# Patient Record
Sex: FEMALE | Race: WHITE | Employment: PART TIME | URBAN - NONMETROPOLITAN AREA
[De-identification: names, ages, dates, MRNs, and addresses within clinical notes are randomized per-mention and may not be internally consistent; named-entity substitution may affect disease eponyms.]

---

## 2018-01-29 ENCOUNTER — DOCUMENTATION ONLY (OUTPATIENT)
Dept: FAMILY MEDICINE | Facility: OTHER | Age: 53
End: 2018-01-29

## 2020-04-09 ENCOUNTER — VIRTUAL VISIT (OUTPATIENT)
Dept: FAMILY MEDICINE | Facility: OTHER | Age: 55
End: 2020-04-09
Attending: FAMILY MEDICINE
Payer: COMMERCIAL

## 2020-04-09 DIAGNOSIS — R06.09 DOE (DYSPNEA ON EXERTION): ICD-10-CM

## 2020-04-09 DIAGNOSIS — F17.200 SMOKER: Primary | ICD-10-CM

## 2020-04-09 PROCEDURE — 99213 OFFICE O/P EST LOW 20 MIN: CPT | Mod: TEL | Performed by: FAMILY MEDICINE

## 2020-04-09 NOTE — PROGRESS NOTES
"Subjective     Megan Cash is a 55 year old female who is being evaluated via a billable telephone visit.      The patient has been notified of following:     \"This telephone visit will be conducted via a call between you and your physician/provider. We have found that certain health care needs can be provided without the need for a physical exam.  This service lets us provide the care you need with a short phone conversation.  If a prescription is necessary we can send it directly to your pharmacy.  If lab work is needed we can place an order for that and you can then stop by our lab to have the test done at a later time.    Telephone visits are billed at different rates depending on your insurance coverage. During this emergency period, for some insurers they may be billed the same as an in-person visit.  Please reach out to your insurance provider with any questions.    If during the course of the call the physician/provider feels a telephone visit is not appropriate, you will not be charged for this service.\"    Patient has given verbal consent for Telephone visit?  Yes    Megan Cash complains of   Chief Complaint   Patient presents with     Form Request       ALLERGIES  Patient has no allergy information on record.      A manager at [email or web address]; and still working at this time.    Always having SOB; is a smoker.  Caroline and Jerome - had influenza.  No prior breathing studies/treatment.    3 weeks was exposed to       Patient Active Problem List   Diagnosis     Adenomatous colon polyp     Alcohol abuse, daily use     Alcoholic (H)     Full dentures     PCR positive for hepatitis C virus     Smoker     Past Surgical History:   Procedure Laterality Date     COLONOSCOPY  05/16/2016 5/16/16,[id number],COLONOSCOPY W/ BIOPSIES,colon polyps-repeat in 3 years     CONIZATION LEEP      10/28/2010,Had YAHAIRA 1 but wanted something done     LAPAROSCOPIC TUBAL LIGATION      1988     OTHER SURGICAL HISTORY      " "[id number],OTHER,Full dentures       Social History     Tobacco Use     Smoking status: Current Every Day Smoker     Packs/day: 0.75     Types: Cigarettes   Substance Use Topics     Alcohol use: Yes     Alcohol/week: 0.0 standard drinks     Comment: Alcoholic Drinks/day: 4-6 beer a day     Family History   Problem Relation Age of Onset     Breast Cancer Sister 50        Cancer-breast     Diabetes Sister         Diabetes     Diabetes Paternal Grandfather         Diabetes     Other - See Comments Father         copd           Reviewed and updated as needed this visit by Provider  Meds  Problems  Med Hx  Surg Hx  Fam Hx         Review of Systems   ROS COMP: CONSTITUTIONAL: NEGATIVE for fever, chills, change in weight  ENT/MOUTH: NEGATIVE for ear, mouth and throat problems  RESP: NEGATIVE for significant cough or SOB  CV: NEGATIVE for chest pain, palpitations or peripheral edema       Objective   Reported vitals:  There were no vitals taken for this visit.   healthy, alert and no distress  Psych: Alert and oriented times 3; coherent speech, normal   rate and volume, able to articulate logical thoughts, able   to abstract reason, no tangential thoughts, no hallucinations   or delusions  Her affect is normal.     Diagnostic Test Results:  none         Assessment/Plan:  1. Smoker  Encouraged cessation which patient is unwilling to discuss today.  - Pulmonary Function Test (); Future    2. CAO (dyspnea on exertion)  Likely COPD; but no previous evaluation/testing completed.  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  I encourage her to discuss this with her HR/supervisor as I am unable to provide such a note without a diagnosis.  She is most anxious about the Covid-19 and \"getting sick.\"  I discussed some facts with her and she is somewhat put at ease; but I think overall frustrated with the visit as I am not providing her with a work note.  Will plan for PFTs in the future, once Covid-19 " pandemic has passed.  - Pulmonary Function Test (); Future    Phone call duration:  24 minutes  Start: 1:19 pm  Stop: 1:43 pm    Marisa Zepeda,   Family Practice

## 2020-06-08 ENCOUNTER — TELEPHONE (OUTPATIENT)
Dept: FAMILY MEDICINE | Facility: OTHER | Age: 55
End: 2020-06-08

## 2020-06-08 ENCOUNTER — OFFICE VISIT (OUTPATIENT)
Dept: FAMILY MEDICINE | Facility: OTHER | Age: 55
End: 2020-06-08
Attending: FAMILY MEDICINE
Payer: COMMERCIAL

## 2020-06-08 VITALS
HEART RATE: 86 BPM | TEMPERATURE: 98.1 F | SYSTOLIC BLOOD PRESSURE: 136 MMHG | OXYGEN SATURATION: 95 % | BODY MASS INDEX: 19.71 KG/M2 | HEIGHT: 60 IN | WEIGHT: 100.4 LBS | RESPIRATION RATE: 16 BRPM | DIASTOLIC BLOOD PRESSURE: 86 MMHG

## 2020-06-08 DIAGNOSIS — Z12.2 ENCOUNTER FOR SCREENING FOR LUNG CANCER: ICD-10-CM

## 2020-06-08 DIAGNOSIS — D12.6 ADENOMATOUS POLYP OF COLON, UNSPECIFIED PART OF COLON: ICD-10-CM

## 2020-06-08 DIAGNOSIS — Z00.00 ROUTINE GENERAL MEDICAL EXAMINATION AT A HEALTH CARE FACILITY: Primary | ICD-10-CM

## 2020-06-08 DIAGNOSIS — F10.10 ALCOHOL ABUSE: ICD-10-CM

## 2020-06-08 DIAGNOSIS — Z12.31 ENCOUNTER FOR SCREENING MAMMOGRAM FOR BREAST CANCER: ICD-10-CM

## 2020-06-08 DIAGNOSIS — Z23 NEED FOR VACCINATION: ICD-10-CM

## 2020-06-08 DIAGNOSIS — J44.9 CHRONIC OBSTRUCTIVE PULMONARY DISEASE, UNSPECIFIED COPD TYPE (H): ICD-10-CM

## 2020-06-08 DIAGNOSIS — Z13.220 LIPID SCREENING: ICD-10-CM

## 2020-06-08 DIAGNOSIS — Z12.11 SPECIAL SCREENING FOR MALIGNANT NEOPLASMS, COLON: ICD-10-CM

## 2020-06-08 DIAGNOSIS — Z87.891 PERSONAL HISTORY OF TOBACCO USE: ICD-10-CM

## 2020-06-08 DIAGNOSIS — B19.20 HEPATITIS C VIRUS INFECTION WITHOUT HEPATIC COMA, UNSPECIFIED CHRONICITY: ICD-10-CM

## 2020-06-08 DIAGNOSIS — F41.1 GAD (GENERALIZED ANXIETY DISORDER): ICD-10-CM

## 2020-06-08 LAB
ALBUMIN SERPL-MCNC: 4.5 G/DL (ref 3.5–5.7)
ALP SERPL-CCNC: 54 U/L (ref 34–104)
ALT SERPL W P-5'-P-CCNC: 63 U/L (ref 7–52)
ANION GAP SERPL CALCULATED.3IONS-SCNC: 7 MMOL/L (ref 3–14)
AST SERPL W P-5'-P-CCNC: 64 U/L (ref 13–39)
BASOPHILS # BLD AUTO: 0.1 10E9/L (ref 0–0.2)
BASOPHILS NFR BLD AUTO: 0.8 %
BILIRUB SERPL-MCNC: 0.6 MG/DL (ref 0.3–1)
BUN SERPL-MCNC: 6 MG/DL (ref 7–25)
CALCIUM SERPL-MCNC: 9.7 MG/DL (ref 8.6–10.3)
CHLORIDE SERPL-SCNC: 100 MMOL/L (ref 98–107)
CHOLEST SERPL-MCNC: 211 MG/DL
CO2 SERPL-SCNC: 30 MMOL/L (ref 21–31)
CREAT SERPL-MCNC: 0.82 MG/DL (ref 0.6–1.2)
DIFFERENTIAL METHOD BLD: ABNORMAL
EOSINOPHIL # BLD AUTO: 0.2 10E9/L (ref 0–0.7)
EOSINOPHIL NFR BLD AUTO: 2.5 %
ERYTHROCYTE [DISTWIDTH] IN BLOOD BY AUTOMATED COUNT: 13.1 % (ref 10–15)
GFR SERPL CREATININE-BSD FRML MDRD: ABNORMAL ML/MIN/{1.73_M2}
GLUCOSE SERPL-MCNC: 81 MG/DL (ref 70–105)
HCT VFR BLD AUTO: 48 % (ref 35–47)
HDLC SERPL-MCNC: 89 MG/DL (ref 23–92)
HGB BLD-MCNC: 16.2 G/DL (ref 11.7–15.7)
IMM GRANULOCYTES # BLD: 0 10E9/L (ref 0–0.4)
IMM GRANULOCYTES NFR BLD: 0.1 %
LDLC SERPL CALC-MCNC: 110 MG/DL
LYMPHOCYTES # BLD AUTO: 2.7 10E9/L (ref 0.8–5.3)
LYMPHOCYTES NFR BLD AUTO: 37.7 %
MCH RBC QN AUTO: 32.9 PG (ref 26.5–33)
MCHC RBC AUTO-ENTMCNC: 33.8 G/DL (ref 31.5–36.5)
MCV RBC AUTO: 98 FL (ref 78–100)
MONOCYTES # BLD AUTO: 0.5 10E9/L (ref 0–1.3)
MONOCYTES NFR BLD AUTO: 7.2 %
NEUTROPHILS # BLD AUTO: 3.7 10E9/L (ref 1.6–8.3)
NEUTROPHILS NFR BLD AUTO: 51.7 %
NONHDLC SERPL-MCNC: 122 MG/DL
PLATELET # BLD AUTO: 208 10E9/L (ref 150–450)
POTASSIUM SERPL-SCNC: 4 MMOL/L (ref 3.5–5.1)
PROT SERPL-MCNC: 7.9 G/DL (ref 6.4–8.9)
RBC # BLD AUTO: 4.92 10E12/L (ref 3.8–5.2)
SODIUM SERPL-SCNC: 137 MMOL/L (ref 134–144)
TRIGL SERPL-MCNC: 61 MG/DL
TSH SERPL DL<=0.05 MIU/L-ACNC: 1.01 IU/ML (ref 0.34–5.6)
WBC # BLD AUTO: 7.1 10E9/L (ref 4–11)

## 2020-06-08 PROCEDURE — 85025 COMPLETE CBC W/AUTO DIFF WBC: CPT | Mod: ZL | Performed by: FAMILY MEDICINE

## 2020-06-08 PROCEDURE — 80061 LIPID PANEL: CPT | Mod: ZL | Performed by: FAMILY MEDICINE

## 2020-06-08 PROCEDURE — 99396 PREV VISIT EST AGE 40-64: CPT | Mod: 25 | Performed by: FAMILY MEDICINE

## 2020-06-08 PROCEDURE — 84443 ASSAY THYROID STIM HORMONE: CPT | Mod: ZL | Performed by: FAMILY MEDICINE

## 2020-06-08 PROCEDURE — 80053 COMPREHEN METABOLIC PANEL: CPT | Mod: ZL | Performed by: FAMILY MEDICINE

## 2020-06-08 PROCEDURE — 86803 HEPATITIS C AB TEST: CPT | Mod: ZL | Performed by: FAMILY MEDICINE

## 2020-06-08 PROCEDURE — G0296 VISIT TO DETERM LDCT ELIG: HCPCS | Performed by: FAMILY MEDICINE

## 2020-06-08 PROCEDURE — 90471 IMMUNIZATION ADMIN: CPT | Performed by: FAMILY MEDICINE

## 2020-06-08 PROCEDURE — 36415 COLL VENOUS BLD VENIPUNCTURE: CPT | Mod: ZL | Performed by: FAMILY MEDICINE

## 2020-06-08 PROCEDURE — 87522 HEPATITIS C REVRS TRNSCRPJ: CPT | Mod: ZL | Performed by: FAMILY MEDICINE

## 2020-06-08 PROCEDURE — 90732 PPSV23 VACC 2 YRS+ SUBQ/IM: CPT | Performed by: FAMILY MEDICINE

## 2020-06-08 PROCEDURE — 87902 NFCT AGT GNTYP ALYS HEP C: CPT | Mod: ZL | Performed by: FAMILY MEDICINE

## 2020-06-08 RX ORDER — ALBUTEROL SULFATE 90 UG/1
2 AEROSOL, METERED RESPIRATORY (INHALATION) EVERY 4 HOURS PRN
Qty: 1 INHALER | Refills: 3 | Status: SHIPPED | OUTPATIENT
Start: 2020-06-08 | End: 2020-09-11

## 2020-06-08 RX ORDER — ESCITALOPRAM OXALATE 10 MG/1
10 TABLET ORAL DAILY
Qty: 30 TABLET | Refills: 1 | Status: SHIPPED | OUTPATIENT
Start: 2020-06-08 | End: 2020-08-28

## 2020-06-08 RX ORDER — ALBUTEROL SULFATE 90 UG/1
2 AEROSOL, METERED RESPIRATORY (INHALATION) EVERY 4 HOURS PRN
Qty: 1 INHALER | Refills: 3 | Status: CANCELLED | OUTPATIENT
Start: 2020-06-08

## 2020-06-08 SDOH — HEALTH STABILITY: MENTAL HEALTH: HOW MANY STANDARD DRINKS CONTAINING ALCOHOL DO YOU HAVE ON A TYPICAL DAY?: 5 OR 6

## 2020-06-08 SDOH — HEALTH STABILITY: MENTAL HEALTH: HOW OFTEN DO YOU HAVE 6 OR MORE DRINKS ON ONE OCCASION?: WEEKLY

## 2020-06-08 SDOH — HEALTH STABILITY: MENTAL HEALTH: HOW OFTEN DO YOU HAVE A DRINK CONTAINING ALCOHOL?: 2-3 TIMES A WEEK

## 2020-06-08 ASSESSMENT — MIFFLIN-ST. JEOR: SCORE: 966.91

## 2020-06-08 ASSESSMENT — PAIN SCALES - GENERAL: PAINLEVEL: NO PAIN (0)
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
## 2020-06-08 NOTE — TELEPHONE ENCOUNTER
Patient states requested a medication for anxiety as well and did not receive it.     Please contact patient at [phone].   Christina Kimball on 6/8/2020 at 4:48 PM

## 2020-06-08 NOTE — PATIENT INSTRUCTIONS
Preventive Health Recommendations  Female Ages 50 - 64    Yearly exam: See your health care provider every year in order to  o Review health changes.   o Discuss preventive care.    o Review your medicines if your doctor has prescribed any.      Get a Pap test every three years (unless you have an abnormal result and your provider advises testing more often).    If you get Pap tests with HPV test, you only need to test every 5 years, unless you have an abnormal result.     You do not need a Pap test if your uterus was removed (hysterectomy) and you have not had cancer.    You should be tested each year for STDs (sexually transmitted diseases) if you're at risk.     Have a mammogram every 1 to 2 years.    Have a colonoscopy at age 50, or have a yearly FIT test (stool test). These exams screen for colon cancer.      Have a cholesterol test every 5 years, or more often if advised.    Have a diabetes test (fasting glucose) every three years. If you are at risk for diabetes, you should have this test more often.     If you are at risk for osteoporosis (brittle bone disease), think about having a bone density scan (DEXA).    Shots: Get a flu shot each year. Get a tetanus shot every 10 years.    Nutrition:     Eat at least 5 servings of fruits and vegetables each day.    Eat whole-grain bread, whole-wheat pasta and brown rice instead of white grains and rice.    Get adequate Calcium and Vitamin D.     Lifestyle    Exercise at least 150 minutes a week (30 minutes a day, 5 days a week). This will help you control your weight and prevent disease.    Limit alcohol to one drink per day.    No smoking.     Wear sunscreen to prevent skin cancer.     See your dentist every six months for an exam and cleaning.    See your eye doctor every 1 to 2 years.    Lung Cancer Screening   Frequently Asked Questions  If you are at high-risk for lung cancer, getting screened with low-dose computed tomography (LDCT) every year can help save  your life. This handout offers answers to some of the most common questions about lung cancer screening. If you have other questions, please call [phone] ([phone]).     What is it?  Lung cancer screening uses special X-ray technology to create an image of your lung tissue. The exam is quick and easy and takes less than 10 seconds. We don t give you any medicine or use any needles. You can eat before and after the exam. You don t need to change your clothes as long as the clothing on your chest doesn t contain metal. But, you do need to be able to hold your breath for at least 6 seconds during the exam.    What is the goal of lung cancer screening?  The goal of lung cancer screening is to save lives. Many times, lung cancer is not found until a person starts having physical symptoms. Lung cancer screening can help detect lung cancer in the earliest stages when it may be easier to treat.    Who should be screened for lung cancer?  We suggest lung cancer screening for anyone who is at high-risk for lung cancer. You are in the high-risk group if you:      are between the ages of 55 and 79, and    have smoked at least 1 pack of cigarettes a day for 30 or more years, and    still smoke or have quit within the past 15 years.    However, if you have a new cough or shortness of breath, you should talk to your doctor before being screened.    Some national lung health advocacy groups also recommend screening for people ages 50 to 79 who have smoked an average of 1 pack of cigarettes a day for 20 years. They must also have at least 1 other risk factor for lung cancer, not including exposure to secondhand smoke. Other risk factors are having had cancer in the past, emphysema, pulmonary fibrosis, COPD, a family history of lung cancer, or exposure to certain materials such as arsenic, asbestos, beryllium, cadmium, chromium, diesel fumes, nickel, radon or silica. Your care team can help you know if you have one of  these risk factors.     Why does it matter if I have symptoms?  Certain symptoms can be a sign that you have a condition in your lungs that should be checked and treated by your doctor. These symptoms include fever, chest pain, a new or changing cough, shortness of breath that you have never felt before, coughing up blood or unexplained weight loss. Having any of these symptoms can greatly affect the results of lung cancer screening.       Should all smokers get an LDCT lung cancer screening exam?  It depends. Lung cancer screening is for a very specific group of men and women who have a history of heavy smoking over a long period of time (see  Who should be screened for lung cancer  above).  I am in the high-risk group, but have been diagnosed with cancer in the past. Is LDCT lung cancer screening right for me?  In some cases, you should not have LDCT lung screening, such as when your doctor is already following your cancer with CT scan studies. Your doctor will help you decide if LDCT lung screening is right for you.  Do I need to have a screening exam every year?  Yes. If you are in the high-risk group described earlier, you should get an LDCT lung cancer screening exam every year until you are 79, or are no longer willing or able to undergo screening and possible procedures to diagnose and treat lung cancer.  How effective is LDCT at preventing death from lung cancer?  Studies have shown that LDCT lung cancer screening can lower the risk of death from lung cancer by 20 percent in people who are at high-risk.  What are the risks?  There are some risks and limitations of LDCT lung cancer screening. We want to make sure you understand the risks and benefits, so please let us know if you have any questions. Your doctor may want to talk with you more about these risks.    Radiation exposure: As with any exam that uses radiation, there is a very small increased risk of cancer. The amount of radiation in LDCT is  small--about the same amount a person would get from a mammogram. Your doctor orders the exam when he or she feels the potential benefits outweigh the risks.    False negatives: No test is perfect, including LDCT. It is possible that you may have a medical condition, including lung cancer, that is not found during your exam. This is called a false negative result.    False positives and more testing: LDCT very often finds something in the lung that could be cancer, but in fact is not. This is called a false positive result. False positive tests often cause anxiety. To make sure these findings are not cancer, you may need to have more tests. These tests will be done only if you give us permission. Sometimes patients need a treatment that can have side effects, such as a biopsy. For more information on false positives, see  What can I expect from the results?     Findings not related to lung cancer: Your LDCT exam also takes pictures of areas of your body next to your lungs. In a very small number of cases, the CT scan will show an abnormal finding in one of these areas, such as your kidneys, adrenal glands, liver or thyroid. This finding may not be serious, but you may need more tests. Your doctor can help you decide what other tests you may need, if any.  What can I expect from the results?  About 1 out of 4 LDCT exams will find something that may need more tests. Most of the time, these findings are lung nodules. Lung nodules are very small collections of tissue in the lung. These nodules are very common, and the vast majority--more than 97 percent--are not cancer (benign). Most are normal lymph nodes or small areas of scarring from past infections.  But, if a small lung nodule is found to be cancer, the cancer can be cured more than 90 percent of the time. To know if the nodule is cancer, we may need to get more images before your next yearly screening exam. If the nodule has suspicious features (for example, it  is large, has an odd shape or grows over time), we will refer you to a specialist for further testing.  Will my doctor also get the results?  Yes. Your doctor will get a copy of your results.  Is it okay to keep smoking now that there s a cancer screening exam?  No. Tobacco is one of the strongest cancer-causing agents. It causes not only lung cancer, but other cancers and cardiovascular (heart) diseases as well. The damage caused by smoking builds over time. This means that the longer you smoke, the higher your risk of disease. While it is never too late to quit, the sooner you quit, the better.  Where can I find help to quit smoking?  The best way to prevent lung cancer is to stop smoking. If you have already quit smoking, congratulations and keep it up! For help on quitting smoking, please call [employer] at 6-661-953-BOCW ([phone]) or the American Cancer Society at 1-152.693.5467 to find local resources near you.  One-on-one health coaching:  If you d prefer to work individually with a health care provider on tobacco cessation, we offer:      Medication Therapy Management:  Our specially trained pharmacists work closely with you and your doctor to help you quit smoking.  Call [phone] or [phone] (toll free).     Can Do: Health coaching offered by [facility].  [email or web address]
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
## 2020-06-08 NOTE — NURSING NOTE
Chief Complaint   Patient presents with     Physical     annual     COPD     Discuss hep C and have a test for it.  Has appointment with Gastroenterology [facility] 6/10/20.      Initial /86   Pulse 86   Temp 98.1  F (36.7  C) (Temporal)   Resp 16   Ht 1.524 m (5')   Wt 45.5 kg (100 lb 6.4 oz)   SpO2 95%   BMI 19.61 kg/m   Estimated body mass index is 19.61 kg/m  as calculated from the following:    Height as of this encounter: 1.524 m (5').    Weight as of this encounter: 45.5 kg (100 lb 6.4 oz).    Medication Reconciliation: complete      Norma J. Gosselin, LPN

## 2020-06-09 ENCOUNTER — MYC MEDICAL ADVICE (OUTPATIENT)
Dept: FAMILY MEDICINE | Facility: OTHER | Age: 55
End: 2020-06-09

## 2020-06-09 NOTE — TELEPHONE ENCOUNTER
Lexapro rx was given at 6/8/2020 appointment. Notified patient  Oxana Moreno LPN ....................  6/9/2020   9:30 AM

## 2020-06-10 ENCOUNTER — TELEPHONE (OUTPATIENT)
Dept: FAMILY MEDICINE | Facility: OTHER | Age: 55
End: 2020-06-10

## 2020-06-10 DIAGNOSIS — Z01.818 PRE-OP TESTING: Primary | ICD-10-CM

## 2020-06-10 DIAGNOSIS — Z86.0100 HISTORY OF COLON POLYPS: ICD-10-CM

## 2020-06-10 LAB — HCV AB SERPL QL IA: REACTIVE

## 2020-06-10 NOTE — TELEPHONE ENCOUNTER
Screening Questions for the Scheduling of Screening Colonoscopies   (If Colonoscopy is diagnostic, Provider should review the chart before scheduling.)  Are you younger than 50 or older than 80?  NO   Do you take aspirin or fish oil?  NO  (if yes, tell patient to stop 1 week prior to Colonoscopy)  Do you take warfarin (Coumadin), clopidogrel (Plavix), apixaban (Eliquis), dabigatram (Pradaxa), rivaroxaban (Xarelto) or any blood thinner? NO   Do you use oxygen at home?  NO   Do you have kidney disease? NO   Are you on dialysis? NO   Have you had a stroke or heart attack in the last year? N   Have you had a stent in your heart or any blood vessel in the last year? NO   Have you had a transplant of any organ? NO   Have you had a colonoscopy or upper endoscopy (EGD) before? YES          When?  4 YRS AGO ?  Date of scheduled Colonoscopy. 07/07/2020  Provider [facility]   Pharmacy [facility]

## 2020-06-10 NOTE — TELEPHONE ENCOUNTER
Dr. Veronica is out of the clinic today, and not scheduled to return until Monday 6/15.    In clinical absence of Dr. Veronica, patient is requesting that this message be sent to the Doc of the Day for consideration please.     Janna Beverly RN .............. 6/10/2020  12:56 PM

## 2020-06-10 NOTE — TELEPHONE ENCOUNTER
[facility] ordered labs and she needs to schedule a lab only appt.  Norma J. Gosselin, LPN .......  6/10/2020  4:29 PM

## 2020-06-11 LAB
HCV RNA SERPL NAA+PROBE-ACNC: ABNORMAL [IU]/ML
HCV RNA SERPL NAA+PROBE-LOG IU: 6.6 LOG IU/ML

## 2020-06-16 RX ORDER — POLYETHYLENE GLYCOL 3350, SODIUM CHLORIDE, SODIUM BICARBONATE, POTASSIUM CHLORIDE 420; 11.2; 5.72; 1.48 G/4L; G/4L; G/4L; G/4L
4000 POWDER, FOR SOLUTION ORAL ONCE
Qty: 4000 ML | Refills: 0 | Status: ON HOLD | OUTPATIENT
Start: 2020-06-16 | End: 2020-07-07

## 2020-06-16 RX ORDER — BISACODYL 5 MG
TABLET, DELAYED RELEASE (ENTERIC COATED) ORAL
Qty: 2 TABLET | Refills: 0 | Status: ON HOLD | OUTPATIENT
Start: 2020-06-16 | End: 2020-07-07

## 2020-06-17 LAB — HCV GENTYP SERPL NAA+PROBE: NORMAL

## 2020-06-23 ENCOUNTER — TELEPHONE (OUTPATIENT)
Dept: FAMILY MEDICINE | Facility: OTHER | Age: 55
End: 2020-06-23

## 2020-06-23 DIAGNOSIS — F40.240 CLAUSTROPHOBIA: Primary | ICD-10-CM

## 2020-06-29 RX ORDER — DIAZEPAM 2 MG
2 TABLET ORAL ONCE
Qty: 1 TABLET | Refills: 0 | Status: SHIPPED | OUTPATIENT
Start: 2020-06-29 | End: 2020-07-21

## 2020-07-04 ENCOUNTER — ALLIED HEALTH/NURSE VISIT (OUTPATIENT)
Dept: FAMILY MEDICINE | Facility: OTHER | Age: 55
End: 2020-07-04
Attending: SURGERY
Payer: COMMERCIAL

## 2020-07-04 DIAGNOSIS — Z01.818 PRE-OP TESTING: ICD-10-CM

## 2020-07-04 PROCEDURE — U0003 INFECTIOUS AGENT DETECTION BY NUCLEIC ACID (DNA OR RNA); SEVERE ACUTE RESPIRATORY SYNDROME CORONAVIRUS 2 (SARS-COV-2) (CORONAVIRUS DISEASE [COVID-19]), AMPLIFIED PROBE TECHNIQUE, MAKING USE OF HIGH THROUGHPUT TECHNOLOGIES AS DESCRIBED BY CMS-2020-01-R: HCPCS | Mod: ZL | Performed by: SURGERY

## 2020-07-04 PROCEDURE — C9803 HOPD COVID-19 SPEC COLLECT: HCPCS

## 2020-07-04 PROCEDURE — 99207 ZZC NO CHARGE NURSE ONLY: CPT

## 2020-07-05 LAB
SARS-COV-2 RNA SPEC QL NAA+PROBE: NOT DETECTED
SPECIMEN SOURCE: NORMAL

## 2020-07-07 ENCOUNTER — ANESTHESIA (OUTPATIENT)
Dept: SURGERY | Facility: OTHER | Age: 55
End: 2020-07-07
Payer: COMMERCIAL

## 2020-07-07 ENCOUNTER — ANESTHESIA EVENT (OUTPATIENT)
Dept: SURGERY | Facility: OTHER | Age: 55
End: 2020-07-07
Payer: COMMERCIAL

## 2020-07-07 ENCOUNTER — HOSPITAL ENCOUNTER (OUTPATIENT)
Facility: OTHER | Age: 55
Discharge: HOME | End: 2020-07-07
Attending: SURGERY | Admitting: SURGERY
Payer: COMMERCIAL

## 2020-07-07 VITALS
HEART RATE: 81 BPM | BODY MASS INDEX: 19.63 KG/M2 | HEIGHT: 60 IN | TEMPERATURE: 97.8 F | RESPIRATION RATE: 14 BRPM | DIASTOLIC BLOOD PRESSURE: 64 MMHG | OXYGEN SATURATION: 98 % | SYSTOLIC BLOOD PRESSURE: 109 MMHG | WEIGHT: 100 LBS

## 2020-07-07 DIAGNOSIS — B18.2 HEP C W/ COMA, CHRONIC: ICD-10-CM

## 2020-07-07 LAB
ALBUMIN SERPL-MCNC: 4 G/DL (ref 3.5–5.7)
ALP SERPL-CCNC: 43 U/L (ref 34–104)
ALT SERPL W P-5'-P-CCNC: 35 U/L (ref 7–52)
ANION GAP SERPL CALCULATED.3IONS-SCNC: 10 MMOL/L (ref 3–14)
AST SERPL W P-5'-P-CCNC: 41 U/L (ref 13–39)
BASOPHILS # BLD AUTO: 0.1 10E9/L (ref 0–0.2)
BASOPHILS NFR BLD AUTO: 0.9 %
BILIRUB SERPL-MCNC: 0.3 MG/DL (ref 0.3–1)
BUN SERPL-MCNC: 4 MG/DL (ref 7–25)
CALCIUM SERPL-MCNC: 9.3 MG/DL (ref 8.6–10.3)
CHLORIDE SERPL-SCNC: 100 MMOL/L (ref 98–107)
CO2 SERPL-SCNC: 27 MMOL/L (ref 21–31)
CREAT SERPL-MCNC: 0.66 MG/DL (ref 0.6–1.2)
DIFFERENTIAL METHOD BLD: NORMAL
EOSINOPHIL # BLD AUTO: 0.1 10E9/L (ref 0–0.7)
EOSINOPHIL NFR BLD AUTO: 2.2 %
ERYTHROCYTE [DISTWIDTH] IN BLOOD BY AUTOMATED COUNT: 13.2 % (ref 10–15)
GFR SERPL CREATININE-BSD FRML MDRD: >90 ML/MIN/{1.73_M2}
GLUCOSE SERPL-MCNC: 85 MG/DL (ref 70–105)
HCT VFR BLD AUTO: 45.8 % (ref 35–47)
HGB BLD-MCNC: 15.5 G/DL (ref 11.7–15.7)
IMM GRANULOCYTES # BLD: 0 10E9/L (ref 0–0.4)
IMM GRANULOCYTES NFR BLD: 0.3 %
INR PPP: 0.93 (ref 0–1.3)
LYMPHOCYTES # BLD AUTO: 1.9 10E9/L (ref 0.8–5.3)
LYMPHOCYTES NFR BLD AUTO: 32.4 %
MCH RBC QN AUTO: 33 PG (ref 26.5–33)
MCHC RBC AUTO-ENTMCNC: 33.8 G/DL (ref 31.5–36.5)
MCV RBC AUTO: 98 FL (ref 78–100)
MONOCYTES # BLD AUTO: 0.5 10E9/L (ref 0–1.3)
MONOCYTES NFR BLD AUTO: 7.7 %
NEUTROPHILS # BLD AUTO: 3.3 10E9/L (ref 1.6–8.3)
NEUTROPHILS NFR BLD AUTO: 56.5 %
PLATELET # BLD AUTO: 214 10E9/L (ref 150–450)
POTASSIUM SERPL-SCNC: 4.6 MMOL/L (ref 3.5–5.1)
PROT SERPL-MCNC: 7.4 G/DL (ref 6.4–8.9)
PROTHROMBIN TIME: 11.1 S (ref 11.9–15.2)
RBC # BLD AUTO: 4.69 10E12/L (ref 3.8–5.2)
SODIUM SERPL-SCNC: 137 MMOL/L (ref 134–144)
WBC # BLD AUTO: 5.8 10E9/L (ref 4–11)

## 2020-07-07 PROCEDURE — 45385 COLONOSCOPY W/LESION REMOVAL: CPT | Performed by: NURSE ANESTHETIST, CERTIFIED REGISTERED

## 2020-07-07 PROCEDURE — 80053 COMPREHEN METABOLIC PANEL: CPT | Mod: ZL

## 2020-07-07 PROCEDURE — 83883 ASSAY NEPHELOMETRY NOT SPEC: CPT | Mod: ZL

## 2020-07-07 PROCEDURE — 45380 COLONOSCOPY AND BIOPSY: CPT | Performed by: SURGERY

## 2020-07-07 PROCEDURE — 25000125 ZZHC RX 250: Performed by: NURSE ANESTHETIST, CERTIFIED REGISTERED

## 2020-07-07 PROCEDURE — 25800030 ZZH RX IP 258 OP 636: Performed by: SURGERY

## 2020-07-07 PROCEDURE — 86706 HEP B SURFACE ANTIBODY: CPT | Mod: ZL

## 2020-07-07 PROCEDURE — 86704 HEP B CORE ANTIBODY TOTAL: CPT | Mod: ZL

## 2020-07-07 PROCEDURE — 85025 COMPLETE CBC W/AUTO DIFF WBC: CPT | Mod: ZL

## 2020-07-07 PROCEDURE — 45385 COLONOSCOPY W/LESION REMOVAL: CPT | Mod: PT | Performed by: SURGERY

## 2020-07-07 PROCEDURE — 85610 PROTHROMBIN TIME: CPT | Mod: ZL

## 2020-07-07 PROCEDURE — 82977 ASSAY OF GGT: CPT | Mod: ZL

## 2020-07-07 PROCEDURE — 84460 ALANINE AMINO (ALT) (SGPT): CPT | Mod: ZL

## 2020-07-07 PROCEDURE — 88305 TISSUE EXAM BY PATHOLOGIST: CPT

## 2020-07-07 PROCEDURE — 87902 NFCT AGT GNTYP ALYS HEP C: CPT | Mod: ZL

## 2020-07-07 PROCEDURE — 86709 HEPATITIS A IGM ANTIBODY: CPT | Mod: ZL

## 2020-07-07 PROCEDURE — 84520 ASSAY OF UREA NITROGEN: CPT | Mod: ZL

## 2020-07-07 PROCEDURE — 87340 HEPATITIS B SURFACE AG IA: CPT | Mod: ZL

## 2020-07-07 PROCEDURE — 40000010 ZZH STATISTIC ANES STAT CODE-CRNA PER MINUTE: Performed by: SURGERY

## 2020-07-07 PROCEDURE — 25000125 ZZHC RX 250: Performed by: SURGERY

## 2020-07-07 PROCEDURE — 36415 COLL VENOUS BLD VENIPUNCTURE: CPT | Mod: ZL

## 2020-07-07 PROCEDURE — 84450 TRANSFERASE (AST) (SGOT): CPT | Mod: ZL

## 2020-07-07 PROCEDURE — 25000128 H RX IP 250 OP 636: Performed by: NURSE ANESTHETIST, CERTIFIED REGISTERED

## 2020-07-07 RX ORDER — PROPOFOL 10 MG/ML
INJECTION, EMULSION INTRAVENOUS PRN
Status: DISCONTINUED | OUTPATIENT
Start: 2020-07-07 | End: 2020-07-07

## 2020-07-07 RX ORDER — LIDOCAINE HYDROCHLORIDE 20 MG/ML
INJECTION, SOLUTION INFILTRATION; PERINEURAL PRN
Status: DISCONTINUED | OUTPATIENT
Start: 2020-07-07 | End: 2020-07-07

## 2020-07-07 RX ORDER — PROPOFOL 10 MG/ML
INJECTION, EMULSION INTRAVENOUS CONTINUOUS PRN
Status: DISCONTINUED | OUTPATIENT
Start: 2020-07-07 | End: 2020-07-07

## 2020-07-07 RX ORDER — SODIUM CHLORIDE, SODIUM LACTATE, POTASSIUM CHLORIDE, CALCIUM CHLORIDE 600; 310; 30; 20 MG/100ML; MG/100ML; MG/100ML; MG/100ML
INJECTION, SOLUTION INTRAVENOUS CONTINUOUS
Status: DISCONTINUED | OUTPATIENT
Start: 2020-07-07 | End: 2020-07-07 | Stop reason: HOSPADM

## 2020-07-07 RX ORDER — LIDOCAINE 40 MG/G
CREAM TOPICAL
Status: DISCONTINUED | OUTPATIENT
Start: 2020-07-07 | End: 2020-07-07 | Stop reason: HOSPADM

## 2020-07-07 RX ORDER — FLUMAZENIL 0.1 MG/ML
0.2 INJECTION, SOLUTION INTRAVENOUS
Status: DISCONTINUED | OUTPATIENT
Start: 2020-07-07 | End: 2020-07-07 | Stop reason: HOSPADM

## 2020-07-07 RX ORDER — NALOXONE HYDROCHLORIDE 0.4 MG/ML
.1-.4 INJECTION, SOLUTION INTRAMUSCULAR; INTRAVENOUS; SUBCUTANEOUS
Status: DISCONTINUED | OUTPATIENT
Start: 2020-07-07 | End: 2020-07-07 | Stop reason: HOSPADM

## 2020-07-07 RX ADMIN — SODIUM CHLORIDE, POTASSIUM CHLORIDE, SODIUM LACTATE AND CALCIUM CHLORIDE 30 ML/HR: 600; 310; 30; 20 INJECTION, SOLUTION INTRAVENOUS at 08:20

## 2020-07-07 RX ADMIN — LIDOCAINE HYDROCHLORIDE 40 MG: 20 INJECTION, SOLUTION INFILTRATION; PERINEURAL at 08:40

## 2020-07-07 RX ADMIN — PROPOFOL 50 MG: 10 INJECTION, EMULSION INTRAVENOUS at 08:40

## 2020-07-07 RX ADMIN — PROPOFOL 140 MCG/KG/MIN: 10 INJECTION, EMULSION INTRAVENOUS at 08:40

## 2020-07-07 SDOH — HEALTH STABILITY: MENTAL HEALTH: CURRENT SMOKER: 1

## 2020-07-07 ASSESSMENT — MIFFLIN-ST. JEOR: SCORE: 965.1

## 2020-07-07 ASSESSMENT — COPD QUESTIONNAIRES: COPD: 1

## 2020-07-07 ASSESSMENT — LIFESTYLE VARIABLES: TOBACCO_USE: 1

## 2020-07-07 NOTE — INTERVAL H&P NOTE
I saw and examined Megan Cash.  I have reviewed the history and physical and find no changes to the patient's medical status or condition with the exceptions noted below.   Megan Cash denies family history of colon cancer or inflammatory bowel disease. Patient denies change in bowel habits or blood in stools. Previous colonoscopy was 5 yr ago, adenomatous polyps.   The technical details of colonoscopy were discussed with the patient along with the risks and benefits to include bleeding, perforation and incomplete study. Megan Cash demonstrated understanding and is willing to proceed.       Robson Baeza MD   8:31 AM 7/7/2020

## 2020-07-07 NOTE — DISCHARGE INSTRUCTIONS
[facility]  Adult Discharge Orders & Instructions    ________________________________________________________________          For 12 hours after surgery  1. Get plenty of rest.  A responsible adult must stay with you for at least 12 hours after you leave the hospital.   2. You may feel lightheaded.  IF so, sit for a few minutes before standing.  Have someone help you get up.   3. You may have a slight fever. Call the doctor if your fever is over 101 F (38.3 C) (taken under the tongue) or lasts longer than 24 hours.  4. You may have a dry mouth, a sore throat, muscle aches or trouble sleeping.  These should go away after 24 hours.  5. Do not make important or legal decisions.  6.   Do not drive or use heavy equipment.  If you have weakness or tingling, don't drive or use heavy equipment until this feeling goes away.    To contact a doctor, call   [phone]_______________________

## 2020-07-07 NOTE — ANESTHESIA CARE TRANSFER NOTE
Patient: Megan Cash    Procedure(s):  COLONOSCOPY, WITH POLYPECTOMY AND BIOPSY    Diagnosis: History of colon polyps [Z86.010]  Diagnosis Additional Information: No value filed.    Anesthesia Type:   MAC     Note:  Airway :Room Air  Patient transferred to:Phase II  Handoff Report: Identifed the Patient, Identified the Reponsible Provider, Reviewed the pertinent medical history, Discussed the surgical course, Reviewed Intra-OP anesthesia mangement and issues during anesthesia, Set expectations for post-procedure period and Allowed opportunity for questions and acknowledgement of understanding      Vitals: (Last set prior to Anesthesia Care Transfer)    CRNA VITALS  7/7/2020 0846 - 7/7/2020 0917      7/7/2020             Pulse:  83    Ht Rate:  83    SpO2:  98 %    Resp Rate (set):  10                Electronically Signed By: BANDAR LAU CRNA  July 7, 2020  9:17 AM

## 2020-07-07 NOTE — ANESTHESIA POSTPROCEDURE EVALUATION
Patient: Megan Cash    Procedure(s):  COLONOSCOPY, WITH POLYPECTOMY AND BIOPSY    Diagnosis:History of colon polyps [Z86.010]  Diagnosis Additional Information: No value filed.    Anesthesia Type:  MAC    Note:  Anesthesia Post Evaluation    Patient location during evaluation: Phase 2  Patient participation: Able to fully participate in evaluation  Level of consciousness: awake and alert  Pain management: adequate  Airway patency: patent  Cardiovascular status: acceptable  Respiratory status: acceptable  Hydration status: acceptable  PONV: none             Last vitals:  Vitals:    07/07/20 0920 07/07/20 0930 07/07/20 0945   BP: (!) 88/52 98/71 109/64   Pulse: 80 83 81   Resp:      Temp: 97.9  F (36.6  C)  97.8  F (36.6  C)   SpO2: 98% 99% 98%         Electronically Signed By: BANDAR LAU CRNA  July 7, 2020  10:42 AM

## 2020-07-07 NOTE — OR NURSING
AVS reviewed, pt denies questions. MD in to speak with pt earlier, she was awake. Pt steady on feet, up to bathroom to void. IV removed. Pt denies dizziness or pain. Taken out ambulatory to daughter in laws car in front of clinic.

## 2020-07-07 NOTE — ANESTHESIA PREPROCEDURE EVALUATION
Anesthesia Pre-Procedure Evaluation    Patient: Megan Cash   MRN: [id number] : 1965          Preoperative Diagnosis: History of colon polyps [Z86.010]    Procedure(s):  COLONOSCOPY    Past Medical History:   Diagnosis Date     Nicotine dependence, uncomplicated     No Comments Provided     Presence of dental prosthetic device     No Comments Provided     Uncomplicated alcohol abuse     No Comments Provided     Viral hepatitis C without hepatic coma     2016     Past Surgical History:   Procedure Laterality Date     COLONOSCOPY  2016,[id number],COLONOSCOPY W/ BIOPSIES,colon polyps-repeat in 3 years     CONIZATION LEEP      10/28/2010,Had YAHAIRA 1 but wanted something done     LAPAROSCOPIC TUBAL LIGATION           OTHER SURGICAL HISTORY      [id number],OTHER,Full dentures       Anesthesia Evaluation     . Pt has had prior anesthetic. Type: General and MAC           ROS/MED HX    ENT/Pulmonary: Comment: Has PFT testing next month, sob with activity    (+)JOSEPH risk factors snores loudly, tobacco use, Current use .75 packs/day  COPD, , . .    Neurologic:  - neg neurologic ROS     Cardiovascular:  - neg cardiovascular ROS       METS/Exercise Tolerance:  4 - Raking leaves, gardening   Hematologic:  - neg hematologic  ROS       Musculoskeletal:  - neg musculoskeletal ROS       GI/Hepatic:     (+) bowel prep, hepatitis type C, liver disease,       Renal/Genitourinary:  - ROS Renal section negative       Endo:  - neg endo ROS       Psychiatric:     (+) psychiatric history anxiety (ETOH abuse hx, quit drugs 15 yrs ago)      Infectious Disease:  - neg infectious disease ROS       Malignancy:      - no malignancy   Other:    - neg other ROS                      Physical Exam  Normal systems: pulmonary    Airway   Mallampati: II  TM distance: >3 FB  Neck ROM: full    Dental   (+) upper dentures and lower dentures    Cardiovascular   Rhythm and rate: regular and normal      Pulmonary             Lab  Results   Component Value Date    WBC 7.1 06/08/2020    HGB 16.2 (H) 06/08/2020    HCT 48.0 (H) 06/08/2020     06/08/2020     06/08/2020    POTASSIUM 4.0 06/08/2020    CHLORIDE 100 06/08/2020    CO2 30 06/08/2020    BUN 6 (L) 06/08/2020    CR 0.82 06/08/2020    GLC 81 06/08/2020    KIT 9.7 06/08/2020    ALBUMIN 4.5 06/08/2020    PROTTOTAL 7.9 06/08/2020    ALT 63 (H) 06/08/2020    AST 64 (H) 06/08/2020    ALKPHOS 54 06/08/2020    BILITOTAL 0.6 06/08/2020       Preop Vitals  BP Readings from Last 3 Encounters:   07/07/20 115/82   06/08/20 136/86   05/02/16 (!) 138/102    Pulse Readings from Last 3 Encounters:   06/08/20 86   05/02/16 70      Resp Readings from Last 3 Encounters:   07/07/20 14   06/08/20 16    SpO2 Readings from Last 3 Encounters:   07/07/20 95%   06/08/20 95%      Temp Readings from Last 1 Encounters:   07/07/20 96.4  F (35.8  C) (Tympanic)    Ht Readings from Last 1 Encounters:   07/07/20 1.524 m (5')      Wt Readings from Last 1 Encounters:   07/07/20 45.4 kg (100 lb)    Estimated body mass index is 19.53 kg/m  as calculated from the following:    Height as of this encounter: 1.524 m (5').    Weight as of this encounter: 45.4 kg (100 lb).       Anesthesia Plan      History & Physical Review      ASA Status:  3 .    NPO Status:  > 4 hours    Plan for MAC with Propofol induction. Reason for MAC:  Extreme anxiety (QS)      The patient is a current Smoker, Patient was instructed to abstain from smoking on day of procedure and patient smoked on day of surgery     Postoperative Care      Consents  Anesthetic plan, risks, benefits and alternatives discussed with:  Patient and Daughter/Son..                 BANDAR LAU CRNA

## 2020-07-08 LAB
HAV IGM SERPL QL IA: NONREACTIVE
HBV CORE AB SERPL QL IA: NONREACTIVE
HBV SURFACE AB SERPL IA-ACNC: 0.63 M[IU]/ML
HBV SURFACE AG SERPL QL IA: NONREACTIVE

## 2020-07-09 ENCOUNTER — HOSPITAL ENCOUNTER (OUTPATIENT)
Dept: MAMMOGRAPHY | Facility: OTHER | Age: 55
End: 2020-07-09
Attending: FAMILY MEDICINE
Payer: COMMERCIAL

## 2020-07-09 ENCOUNTER — HOSPITAL ENCOUNTER (OUTPATIENT)
Dept: RESPIRATORY THERAPY | Facility: OTHER | Age: 55
End: 2020-07-09
Attending: FAMILY MEDICINE
Payer: COMMERCIAL

## 2020-07-09 ENCOUNTER — HOSPITAL ENCOUNTER (OUTPATIENT)
Dept: CT IMAGING | Facility: OTHER | Age: 55
End: 2020-07-09
Attending: FAMILY MEDICINE
Payer: COMMERCIAL

## 2020-07-09 DIAGNOSIS — F17.200 SMOKER: ICD-10-CM

## 2020-07-09 DIAGNOSIS — Z12.31 ENCOUNTER FOR SCREENING MAMMOGRAM FOR BREAST CANCER: ICD-10-CM

## 2020-07-09 DIAGNOSIS — Z12.2 ENCOUNTER FOR SCREENING FOR LUNG CANCER: ICD-10-CM

## 2020-07-09 DIAGNOSIS — R06.09 DOE (DYSPNEA ON EXERTION): ICD-10-CM

## 2020-07-09 PROCEDURE — 94726 PLETHYSMOGRAPHY LUNG VOLUMES: CPT

## 2020-07-09 PROCEDURE — 77063 BREAST TOMOSYNTHESIS BI: CPT

## 2020-07-09 PROCEDURE — 94726 PLETHYSMOGRAPHY LUNG VOLUMES: CPT | Mod: 26 | Performed by: INTERNAL MEDICINE

## 2020-07-09 PROCEDURE — 40000275 ZZH STATISTIC RCP TIME EA 10 MIN

## 2020-07-09 PROCEDURE — 94729 DIFFUSING CAPACITY: CPT | Mod: 26 | Performed by: INTERNAL MEDICINE

## 2020-07-09 PROCEDURE — G0297 LDCT FOR LUNG CA SCREEN: HCPCS

## 2020-07-09 PROCEDURE — 94729 DIFFUSING CAPACITY: CPT

## 2020-07-09 PROCEDURE — 94010 BREATHING CAPACITY TEST: CPT | Mod: 26 | Performed by: INTERNAL MEDICINE

## 2020-07-09 PROCEDURE — 94010 BREATHING CAPACITY TEST: CPT

## 2020-07-09 RX ORDER — ALBUTEROL SULFATE 90 UG/1
2 AEROSOL, METERED RESPIRATORY (INHALATION) EVERY 6 HOURS PRN
Status: DISCONTINUED | OUTPATIENT
Start: 2020-07-09 | End: 2020-07-10 | Stop reason: HOSPADM

## 2020-07-11 LAB
A2 MACROGLOB SERPL-MCNC: 257 MG/DL (ref 131–293)
ALT SERPL-CCNC: 41 U/L (ref 5–40)
ANNOTATION COMMENT IMP: ABNORMAL
AST SERPL-CCNC: 51 U/L (ref 9–40)
BUN SERPL-MCNC: 4 MG/DL (ref 7–20)
CIRRHOMETER PATIENT SCORE: 0.02
FIBROSIS STAGE SERPL QL: ABNORMAL
GGT SERPL-CCNC: 17 U/L (ref 7–33)
INFLAMETER METAVIR CLASSIFICATION: ABNORMAL
INFLAMETER PATIENT SCORE: 0.33
LIVER FIBR SCORE SERPL CALC.FIBROMETER: 0.46
PATHOLOGY STUDY: ABNORMAL
PLATELET # BLD: 214 10*3/UL
PT INDEX PPP: 104 % (ref 90–120)

## 2020-07-13 ENCOUNTER — TELEPHONE (OUTPATIENT)
Dept: FAMILY MEDICINE | Facility: OTHER | Age: 55
End: 2020-07-13

## 2020-07-13 NOTE — TELEPHONE ENCOUNTER
Reason for call: Request for results.    Name of test or procedure: Pulmonary test    Date of test or procedure: 7/8    Location of test or procedure: [facility]    Preferred method for responding to this message: Telephone Call    Phone number patient can be reached at: Cell number on file:    Telephone Information:   Mobile [phone]       If we can't reach you directly, may we leave a detailed response at the number you provided?Yes    States she is needing the results from the pulmonary test so a form can be filled out for pt

## 2020-07-15 ENCOUNTER — TELEPHONE (OUTPATIENT)
Dept: FAMILY MEDICINE | Facility: OTHER | Age: 55
End: 2020-07-15

## 2020-07-15 LAB — HCV GENTYP SERPL NAA+PROBE: NORMAL

## 2020-07-15 NOTE — TELEPHONE ENCOUNTER
EDUIN Veronica-pts daughter in law-Caroline was transferred to me after speaking with nurse to schedule an appt. Caroline is asking for a work in appt for tomorrow. Doesn't think pt should wait til next week. Please call again. Thank you.  Renee Henriquez

## 2020-07-16 NOTE — TELEPHONE ENCOUNTER
They can't make it today but I scheduled her for 7/21/20 at 1440.  Norma J. Gosselin, LPN .......  7/16/2020  12:30 PM

## 2020-07-20 NOTE — TELEPHONE ENCOUNTER
Closing encounter. Please see PFT result note, patient and her daughter were notified of results. She has appointment scheduled for tomorrow with Dr. Veronica.    Mariam Monreal LPN on 7/20/2020 at 9:19 AM

## 2020-07-21 ENCOUNTER — OFFICE VISIT (OUTPATIENT)
Dept: FAMILY MEDICINE | Facility: OTHER | Age: 55
End: 2020-07-21
Attending: FAMILY MEDICINE
Payer: COMMERCIAL

## 2020-07-21 VITALS
RESPIRATION RATE: 16 BRPM | OXYGEN SATURATION: 93 % | HEART RATE: 91 BPM | DIASTOLIC BLOOD PRESSURE: 60 MMHG | SYSTOLIC BLOOD PRESSURE: 110 MMHG | WEIGHT: 96.4 LBS | TEMPERATURE: 97.5 F | BODY MASS INDEX: 18.93 KG/M2 | HEIGHT: 60 IN

## 2020-07-21 DIAGNOSIS — J44.9 CHRONIC OBSTRUCTIVE PULMONARY DISEASE, UNSPECIFIED COPD TYPE (H): Primary | ICD-10-CM

## 2020-07-21 DIAGNOSIS — F41.1 GAD (GENERALIZED ANXIETY DISORDER): ICD-10-CM

## 2020-07-21 LAB — INTERPRETATION ECG - MUSE: NORMAL

## 2020-07-21 PROCEDURE — 93005 ELECTROCARDIOGRAM TRACING: CPT

## 2020-07-21 PROCEDURE — 93010 ELECTROCARDIOGRAM REPORT: CPT | Performed by: INTERNAL MEDICINE

## 2020-07-21 PROCEDURE — G0463 HOSPITAL OUTPT CLINIC VISIT: HCPCS | Performed by: FAMILY MEDICINE

## 2020-07-21 PROCEDURE — 99213 OFFICE O/P EST LOW 20 MIN: CPT | Performed by: FAMILY MEDICINE

## 2020-07-21 ASSESSMENT — ENCOUNTER SYMPTOMS
CHILLS: 0
FEVER: 0

## 2020-07-21 ASSESSMENT — MIFFLIN-ST. JEOR: SCORE: 948.77

## 2020-07-21 ASSESSMENT — PAIN SCALES - GENERAL: PAINLEVEL: NO PAIN (0)

## 2020-07-21 NOTE — PROGRESS NOTES
SUBJECTIVE:   Megan Cash is a 55 year old female who presents to clinic today for the following health issues:    HPI  COPD:  Currently managed on Dulera, 2 puffs twice daily, and Albuterol as needed.  She does not feel that her symptoms are completely controlled on this regimen, though she has seen a little improvement.  She has been using the Albuterol inhaler 3-4 times daily.  She can only walk 0.5-1 block before becoming short of breath.  She denies shortness of breath with talking and at rest.  She denies orthopnea.  She has not had recurrence of chest pressure since starting the Dulera.  She does have a chronic cough which is productive in the morning.    Anxiety:  Has not noticed a significant difference in her anxiety since starting the Escitalopram; however, she had some difficulty remembering to take the medication consistently at first.  She thinks she has probably only been taking it daily for the past three weeks.  She denies complications or adverse effects.    Past Medical History:   Diagnosis Date     Nicotine dependence, uncomplicated     No Comments Provided     Presence of dental prosthetic device     No Comments Provided     Uncomplicated alcohol abuse     No Comments Provided     Viral hepatitis C without hepatic coma     5/11/2016      Past Surgical History:   Procedure Laterality Date     COLONOSCOPY  05/16/2016 5/16/16,[id number],COLONOSCOPY W/ BIOPSIES,colon polyps-repeat in 3 years     COLONOSCOPY N/A 7/7/2020    2 small tubular adenomas.  Follow up 2025     CONIZATION LEEP      10/28/2010,Had YAHAIRA 1 but wanted something done     LAPAROSCOPIC TUBAL LIGATION      1988     OTHER SURGICAL HISTORY      [id number],OTHER,Full dentures     Family History   Problem Relation Age of Onset     Other - See Comments Father         copd     Breast Cancer Sister 50        Cancer-breast     Diabetes Sister         Diabetes     Diabetes Paternal Grandfather         Diabetes     Social History     Tobacco  Use     Smoking status: Current Every Day Smoker     Packs/day: 0.75     Years: 40.00     Pack years: 30.00     Types: Cigarettes     Smokeless tobacco: Never Used   Substance Use Topics     Alcohol use: Yes     Alcohol/week: 32.0 standard drinks     Types: 32 Cans of beer per week     Frequency: 2-3 times a week     Drinks per session: 5 or 6     Binge frequency: Weekly     Comment: Alcoholic Drinks/day: 4-6 beer a day     Current Outpatient Medications   Medication Sig Dispense Refill     albuterol (PROAIR HFA/PROVENTIL HFA/VENTOLIN HFA) 108 (90 Base) MCG/ACT inhaler Inhale 2 puffs into the lungs every 4 hours as needed for shortness of breath / dyspnea or wheezing 1 Inhaler 3     escitalopram (LEXAPRO) 10 MG tablet Take 1 tablet (10 mg) by mouth daily 30 tablet 1     mometasone-formoterol (DULERA) 100-5 MCG/ACT inhaler Inhale 2 puffs into the lungs 2 times daily 1 Inhaler 11     tiotropium (SPIRIVA RESPIMAT) 2.5 MCG/ACT inhaler Inhale 2 puffs into the lungs daily 1 Inhaler 3     No Known Allergies    Review of Systems   Constitutional: Negative for chills and fever.   Cardiovascular: Negative for chest pain.      OBJECTIVE:     /60   Pulse 91   Temp 97.5  F (36.4  C) (Temporal)   Resp 16   Ht 1.524 m (5')   Wt 43.7 kg (96 lb 6.4 oz)   SpO2 93%   BMI 18.83 kg/m    Body mass index is 18.83 kg/m .  Physical Exam  Constitutional:       General: She is not in acute distress.     Appearance: Normal appearance. She is not ill-appearing.   Cardiovascular:      Rate and Rhythm: Normal rate and regular rhythm.   Pulmonary:      Effort: Pulmonary effort is normal.      Breath sounds: No wheezing, rhonchi or rales.      Comments: Poor air movement throughout.  Neurological:      Mental Status: She is alert.   Psychiatric:         Mood and Affect: Mood normal.     Diagnostic Test Results:  EKG - Sinus rhythm with short NH interval, possible left atrial enlargement    ASSESSMENT/PLAN:     1. Chronic obstructive  pulmonary disease, unspecified COPD type (H)  Continue Dulera twice daily.  Start Spiriva.  Continue Albuterol inhaler as needed.  Follow-up in 4 weeks for reassessment.  If no improvement, consider cardiac workup.  - EKG 12-lead, tracing only  - tiotropium (SPIRIVA RESPIMAT) 2.5 MCG/ACT inhaler; Inhale 2 puffs into the lungs daily  Dispense: 1 Inhaler; Refill: 3    2. LISA (generalized anxiety disorder)  Continue Escitalopram 10 mg daily.  Reassess on follow-up and determine need for increased dose versus alternative medication at that time.      Elisabeth Veronica DO  [facility]

## 2020-07-21 NOTE — NURSING NOTE
Chief Complaint   Patient presents with     Results     Pumonary test         Initial /60   Pulse 91   Temp 97.5  F (36.4  C) (Temporal)   Resp 16   Ht 1.524 m (5')   Wt 43.7 kg (96 lb 6.4 oz)   SpO2 93%   BMI 18.83 kg/m   Estimated body mass index is 18.83 kg/m  as calculated from the following:    Height as of this encounter: 1.524 m (5').    Weight as of this encounter: 43.7 kg (96 lb 6.4 oz).    Medication Reconciliation: complete      Norma J. Gosselin, LPN

## 2020-07-24 ENCOUNTER — TRANSFERRED RECORDS (OUTPATIENT)
Dept: HEALTH INFORMATION MANAGEMENT | Facility: OTHER | Age: 55
End: 2020-07-24

## 2020-08-26 DIAGNOSIS — B18.2 CHRONIC HEPATITIS C WITHOUT HEPATIC COMA (H): Primary | ICD-10-CM

## 2020-08-28 ENCOUNTER — TELEPHONE (OUTPATIENT)
Dept: FAMILY MEDICINE | Facility: OTHER | Age: 55
End: 2020-08-28

## 2020-08-28 DIAGNOSIS — F41.1 GAD (GENERALIZED ANXIETY DISORDER): ICD-10-CM

## 2020-08-28 RX ORDER — ESCITALOPRAM OXALATE 10 MG/1
TABLET ORAL
Qty: 30 TABLET | Refills: 1 | Status: SHIPPED | OUTPATIENT
Start: 2020-08-28 | End: 2020-10-30

## 2020-08-28 NOTE — TELEPHONE ENCOUNTER
"[facility] sent Rx request for the following:   escitalopram (LEXAPRO) 10 MG tablet  Sig:TAKE 1 TABLET(10 MG) BY MOUTH DAILY    Last Prescription Date:   06/08/2020  Last Fill Qty/Refills:         30, R-1    Last Office Visit:              07/21/2020   Future Office visit:           09/02/2020     SSRIs Protocol Passed -         Passed - Recent (12 mo) or future (30 days) visit within the authorizing provider's specialty     Patient has had an office visit with the authorizing provider or a provider within the authorizing providers department within the previous 12 mos or has a future within next 30 days. See \"Patient Info\" tab in inbasket, or \"Choose Columns\" in Meds & Orders section of the refill encounter.              Passed - Medication is active on med list        Passed - Patient is age 18 or older        Passed - No active pregnancy on record        Passed - No positive pregnancy test in last 12 months           Prescription approved per [facility].  Lucila Alvarado RN ....................  8/28/2020   10:48 AM      "

## 2020-08-31 ENCOUNTER — MEDICAL CORRESPONDENCE (OUTPATIENT)
Dept: FAMILY MEDICINE | Facility: OTHER | Age: 55
End: 2020-08-31

## 2020-08-31 ENCOUNTER — TELEPHONE (OUTPATIENT)
Dept: FAMILY MEDICINE | Facility: OTHER | Age: 55
End: 2020-08-31

## 2020-08-31 NOTE — TELEPHONE ENCOUNTER
Patient called for medication questions, as she was prescribed Spiriva. Should she be taking this medication in addition to Dulera or discontinue?  Please advise.  Thank you.  Shaunna Lynn LPN LPN....................  8/31/2020   9:03 AM

## 2020-09-02 NOTE — TELEPHONE ENCOUNTER
See telephone encounter dated 8/31/2020.  Sudha Ferrara LPN, LPN  9/2/2020  10:52 AM                Detail Level: Simple Additional Notes: LN2 Additional Notes: Patient already has rx for mupirocin patient advised to use it on the wound from [facility]

## 2020-09-02 NOTE — TELEPHONE ENCOUNTER
Looks like there is a different telephone encounter that is as follows:     Patient called for medication questions, as she was prescribed Spiriva. Should she be taking this medication in addition to Dulera or discontinue?  Please advise.  Thank you.  Shaunna Lynn LPN LPN....................  8/31/2020   9:03

## 2020-09-02 NOTE — TELEPHONE ENCOUNTER
Patient notified that she should be on both.    The Dulera wasn't at the pharmacy ready for .    [facility] was called and told that it was filled in June for a year.    Sandra Santana LPN  9/2/2020  11:08 AM

## 2020-09-10 DIAGNOSIS — J44.9 CHRONIC OBSTRUCTIVE PULMONARY DISEASE, UNSPECIFIED COPD TYPE (H): ICD-10-CM

## 2020-09-11 RX ORDER — ALBUTEROL SULFATE 90 UG/1
AEROSOL, METERED RESPIRATORY (INHALATION)
Qty: 1 INHALER | Refills: 3 | Status: SHIPPED | OUTPATIENT
Start: 2020-09-11 | End: 2021-08-26

## 2020-09-11 NOTE — TELEPHONE ENCOUNTER
"[facility] sent Rx request for the following:   albuterol (PROAIR HFA/PROVENTIL HFA/VENTOLIN HFA) 108 (90 Base) MCG/ACT inhaler   Sig: INHALE 2 PUFFS INTO THE LUNGS EVERY 4 HOURS AS NEEDED FOR SHORTNESS OF BREATH OR DIFFICULT BREATHING OR WHEEZING    Last Prescription Date:   06/08/2020  Last Fill Qty/Refills:         1 inhaler, R-3    Last Office Visit:              07/21/2020 ([place])   Future Office visit:           09/30/2020 ([place])   Asthma Maintenance Inhalers - Anticholinergics Passed -         Passed - Patient is age 12 years or older        Passed - Recent (12 mo) or future (30 days) visit within the authorizing provider's specialty     Patient has had an office visit with the authorizing provider or a provider within the authorizing providers department within the previous 12 mos or has a future within next 30 days. See \"Patient Info\" tab in inbasket, or \"Choose Columns\" in Meds & Orders section of the refill encounter.              Passed - Medication is active on med list       Short-Acting Beta Agonist Inhalers Protocol  Passed - 9/10/2020  3:57 AM        Passed - Patient is age 12 or older        Passed - Recent (12 mo) or future (30 days) visit within the authorizing provider's specialty     Patient has had an office visit with the authorizing provider or a provider within the authorizing providers department within the previous 12 mos or has a future within next 30 days. See \"Patient Info\" tab in inbasket, or \"Choose Columns\" in Meds & Orders section of the refill encounter.              Passed - Medication is active on med list           Prescription approved per [facility] Refill Protocol.  Lucila Alvarado RN ....................  9/11/2020   11:15 AM        "

## 2020-09-22 DIAGNOSIS — B18.2 CHRONIC HEPATITIS C WITHOUT HEPATIC COMA (H): ICD-10-CM

## 2020-09-22 LAB
ALBUMIN SERPL-MCNC: 4.3 G/DL (ref 3.5–5.7)
ALP SERPL-CCNC: 57 U/L (ref 34–104)
ALT SERPL W P-5'-P-CCNC: 11 U/L (ref 7–52)
AST SERPL W P-5'-P-CCNC: 19 U/L (ref 13–39)
BILIRUB DIRECT SERPL-MCNC: 0.1 MG/DL (ref 0–0.2)
BILIRUB SERPL-MCNC: 0.5 MG/DL (ref 0.3–1)
PROT SERPL-MCNC: 7.4 G/DL (ref 6.4–8.9)

## 2020-09-22 PROCEDURE — 87522 HEPATITIS C REVRS TRNSCRPJ: CPT | Mod: ZL

## 2020-09-22 PROCEDURE — 36415 COLL VENOUS BLD VENIPUNCTURE: CPT | Mod: ZL

## 2020-09-22 PROCEDURE — 80076 HEPATIC FUNCTION PANEL: CPT | Mod: ZL

## 2020-09-25 ENCOUNTER — TRANSFERRED RECORDS (OUTPATIENT)
Dept: HEALTH INFORMATION MANAGEMENT | Facility: OTHER | Age: 55
End: 2020-09-25

## 2020-09-25 LAB
HCV RNA SERPL NAA+PROBE-ACNC: 91 [IU]/ML
HCV RNA SERPL NAA+PROBE-LOG IU: 2 LOG IU/ML

## 2020-09-30 ENCOUNTER — TRANSFERRED RECORDS (OUTPATIENT)
Dept: HEALTH INFORMATION MANAGEMENT | Facility: OTHER | Age: 55
End: 2020-09-30

## 2020-10-08 DIAGNOSIS — B18.2 CHRONIC HEPATITIS C WITH HEPATIC COMA (H): Primary | ICD-10-CM

## 2020-10-19 DIAGNOSIS — B18.2 HEP C W/ COMA, CHRONIC: Primary | ICD-10-CM

## 2020-10-19 DIAGNOSIS — B18.2 HEP C W/O COMA, CHRONIC (H): ICD-10-CM

## 2020-10-20 DIAGNOSIS — B18.2 HEP C W/O COMA, CHRONIC (H): ICD-10-CM

## 2020-10-20 PROCEDURE — 36415 COLL VENOUS BLD VENIPUNCTURE: CPT | Mod: ZL | Performed by: NURSE PRACTITIONER

## 2020-10-20 PROCEDURE — 87522 HEPATITIS C REVRS TRNSCRPJ: CPT | Mod: ZL | Performed by: NURSE PRACTITIONER

## 2020-10-22 LAB
HCV RNA SERPL NAA+PROBE-ACNC: NORMAL [IU]/ML
HCV RNA SERPL NAA+PROBE-LOG IU: NORMAL LOG IU/ML

## 2020-10-26 ENCOUNTER — OFFICE VISIT (OUTPATIENT)
Dept: ORTHOPEDICS | Facility: OTHER | Age: 55
End: 2020-10-26
Attending: ORTHOPAEDIC SURGERY
Payer: COMMERCIAL

## 2020-10-26 ENCOUNTER — HOSPITAL ENCOUNTER (OUTPATIENT)
Dept: GENERAL RADIOLOGY | Facility: OTHER | Age: 55
End: 2020-10-26
Attending: ORTHOPAEDIC SURGERY
Payer: COMMERCIAL

## 2020-10-26 DIAGNOSIS — M25.512 ACUTE PAIN OF LEFT SHOULDER: Primary | ICD-10-CM

## 2020-10-26 DIAGNOSIS — M25.512 ACUTE PAIN OF LEFT SHOULDER: ICD-10-CM

## 2020-10-26 PROCEDURE — 23620 CLTX GR HMRL TBRS FX WO MNPJ: CPT | Mod: LT

## 2020-10-26 PROCEDURE — 23620 CLTX GR HMRL TBRS FX WO MNPJ: CPT | Mod: LT | Performed by: ORTHOPAEDIC SURGERY

## 2020-10-26 PROCEDURE — 73030 X-RAY EXAM OF SHOULDER: CPT | Mod: LT

## 2020-10-26 PROCEDURE — G0463 HOSPITAL OUTPT CLINIC VISIT: HCPCS

## 2020-10-27 ENCOUNTER — TRANSFERRED RECORDS (OUTPATIENT)
Dept: HEALTH INFORMATION MANAGEMENT | Facility: OTHER | Age: 55
End: 2020-10-27
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
## 2020-10-28 DIAGNOSIS — B18.2 CHRONIC HEPATITIS C WITHOUT HEPATIC COMA (H): Primary | ICD-10-CM

## 2020-11-04 ENCOUNTER — OFFICE VISIT (OUTPATIENT)
Dept: FAMILY MEDICINE | Facility: OTHER | Age: 55
End: 2020-11-04
Attending: ORTHOPAEDIC SURGERY
Payer: COMMERCIAL

## 2020-11-04 VITALS
SYSTOLIC BLOOD PRESSURE: 120 MMHG | OXYGEN SATURATION: 97 % | WEIGHT: 94 LBS | DIASTOLIC BLOOD PRESSURE: 80 MMHG | HEIGHT: 60 IN | HEART RATE: 94 BPM | RESPIRATION RATE: 16 BRPM | BODY MASS INDEX: 18.46 KG/M2 | TEMPERATURE: 98.1 F

## 2020-11-04 DIAGNOSIS — L84 CORN OR CALLUS: ICD-10-CM

## 2020-11-04 DIAGNOSIS — J44.9 CHRONIC OBSTRUCTIVE PULMONARY DISEASE, UNSPECIFIED COPD TYPE (H): Primary | ICD-10-CM

## 2020-11-04 DIAGNOSIS — F41.1 GAD (GENERALIZED ANXIETY DISORDER): ICD-10-CM

## 2020-11-04 DIAGNOSIS — Z12.4 SCREENING FOR MALIGNANT NEOPLASM OF CERVIX: ICD-10-CM

## 2020-11-04 PROCEDURE — 87624 HPV HI-RISK TYP POOLED RSLT: CPT | Mod: ZL | Performed by: FAMILY MEDICINE

## 2020-11-04 PROCEDURE — 99214 OFFICE O/P EST MOD 30 MIN: CPT | Performed by: FAMILY MEDICINE

## 2020-11-04 PROCEDURE — G0123 SCREEN CERV/VAG THIN LAYER: HCPCS | Performed by: FAMILY MEDICINE

## 2020-11-04 PROCEDURE — G0463 HOSPITAL OUTPT CLINIC VISIT: HCPCS

## 2020-11-04 RX ORDER — TRAMADOL HYDROCHLORIDE 50 MG/1
1 TABLET ORAL EVERY 6 HOURS PRN
COMMUNITY
Start: 2020-10-27 | End: 2021-08-26

## 2020-11-04 RX ORDER — ESCITALOPRAM OXALATE 20 MG/1
20 TABLET ORAL DAILY
Qty: 90 TABLET | Refills: 1 | Status: SHIPPED | OUTPATIENT
Start: 2020-11-04 | End: 2021-08-26

## 2020-11-04 ASSESSMENT — ANXIETY QUESTIONNAIRES
7. FEELING AFRAID AS IF SOMETHING AWFUL MIGHT HAPPEN: NOT AT ALL
GAD7 TOTAL SCORE: 4
5. BEING SO RESTLESS THAT IT IS HARD TO SIT STILL: NOT AT ALL
IF YOU CHECKED OFF ANY PROBLEMS ON THIS QUESTIONNAIRE, HOW DIFFICULT HAVE THESE PROBLEMS MADE IT FOR YOU TO DO YOUR WORK, TAKE CARE OF THINGS AT HOME, OR GET ALONG WITH OTHER PEOPLE: NOT DIFFICULT AT ALL
1. FEELING NERVOUS, ANXIOUS, OR ON EDGE: MORE THAN HALF THE DAYS
6. BECOMING EASILY ANNOYED OR IRRITABLE: NOT AT ALL
2. NOT BEING ABLE TO STOP OR CONTROL WORRYING: SEVERAL DAYS
3. WORRYING TOO MUCH ABOUT DIFFERENT THINGS: SEVERAL DAYS

## 2020-11-04 ASSESSMENT — PAIN SCALES - GENERAL: PAINLEVEL: NO PAIN (0)

## 2020-11-04 ASSESSMENT — ENCOUNTER SYMPTOMS
DYSURIA: 0
FEVER: 0
SHORTNESS OF BREATH: 0
CHILLS: 0

## 2020-11-04 ASSESSMENT — PATIENT HEALTH QUESTIONNAIRE - PHQ9
SUM OF ALL RESPONSES TO PHQ QUESTIONS 1-9: 2
5. POOR APPETITE OR OVEREATING: NOT AT ALL

## 2020-11-04 ASSESSMENT — MIFFLIN-ST. JEOR: SCORE: 937.88

## 2020-11-04 NOTE — NURSING NOTE
Chief Complaint   Patient presents with     Gyn Exam     Pap     RECHECK     toes     Anxiety         Initial /80   Pulse 94   Temp 98.1  F (36.7  C) (Tympanic)   Resp 16   Ht 1.524 m (5')   Wt 42.6 kg (94 lb)   SpO2 97%   BMI 18.36 kg/m   Estimated body mass index is 18.36 kg/m  as calculated from the following:    Height as of this encounter: 1.524 m (5').    Weight as of this encounter: 42.6 kg (94 lb).    Medication Reconciliation: complete      Norma J. Gosselin, LPN

## 2020-11-04 NOTE — PROGRESS NOTES
"  SUBJECTIVE:   Megan Cash is a 55 year old female who presents to clinic today for the following health issues:    HPI  LISA:  Currently managed on Escitalopram.  She as been taking for the past two months.  She feels that the medication is working for her.  She reports that she is falling asleep better, fewer panic attacks, and less mind racing.  She has had some recent setbacks with a shoulder injury and the passing of her brother.  She would like to try a higher dose.    COPD:  Currently managed on Dulera and Spiriva with Albuterol inhaler as needed.  She has not needed the Albuterol but reports she has been less active than typical.  No complications or adverse effects.    Reports an uncomfortable feeling between her fifth and fourth toes on her left foot.  Worse with pressure, particularly when she sits on top of that foot.  She thinks she can \"feel something\" there.    Leep procedure 6-7 years ago.  No abnormal pap smear since.  Last in 2016, clue cells seen at that time.  LMP > 2 years ago.    Past Medical History:   Diagnosis Date     Nicotine dependence, uncomplicated     No Comments Provided     Presence of dental prosthetic device     No Comments Provided     Uncomplicated alcohol abuse     No Comments Provided     Viral hepatitis C without hepatic coma     5/11/2016      Past Surgical History:   Procedure Laterality Date     COLONOSCOPY  05/16/2016 5/16/16,[id number],COLONOSCOPY W/ BIOPSIES,colon polyps-repeat in 3 years     COLONOSCOPY N/A 7/7/2020    2 small tubular adenomas.  Follow up 2025     CONIZATION LEEP      10/28/2010,Had YAHAIRA 1 but wanted something done     LAPAROSCOPIC TUBAL LIGATION      1988     OTHER SURGICAL HISTORY      [id number],OTHER,Full dentures     Family History   Problem Relation Age of Onset     Other - See Comments Father         copd     Breast Cancer Sister 50        Cancer-breast     Diabetes Sister         Diabetes     Diabetes Paternal Grandfather         Diabetes "     Social History     Tobacco Use     Smoking status: Current Every Day Smoker     Packs/day: 0.75     Years: 40.00     Pack years: 30.00     Types: Cigarettes     Smokeless tobacco: Never Used   Substance Use Topics     Alcohol use: Yes     Alcohol/week: 32.0 standard drinks     Types: 32 Cans of beer per week     Frequency: 2-3 times a week     Drinks per session: 5 or 6     Binge frequency: Weekly     Comment: Alcoholic Drinks/day: 4-6 beer a day     Current Outpatient Medications   Medication Sig Dispense Refill     albuterol (PROAIR HFA/PROVENTIL HFA/VENTOLIN HFA) 108 (90 Base) MCG/ACT inhaler INHALE 2 PUFFS INTO THE LUNGS EVERY 4 HOURS AS NEEDED FOR SHORTNESS OF BREATH OR DIFFICULT BREATHING OR WHEEZING 1 Inhaler 3     escitalopram (LEXAPRO) 20 MG tablet Take 1 tablet (20 mg) by mouth daily 90 tablet 1     mometasone-formoterol (DULERA) 100-5 MCG/ACT inhaler Inhale 2 puffs into the lungs 2 times daily 1 Inhaler 11     tiotropium (SPIRIVA RESPIMAT) 2.5 MCG/ACT inhaler Inhale 2 puffs into the lungs daily 1 Inhaler 3     traMADol (ULTRAM) 50 MG tablet Take 1 tablet by mouth every 6 hours as needed       No Known Allergies    Review of Systems   Constitutional: Negative for chills and fever.   Respiratory: Negative for shortness of breath.    Cardiovascular: Negative for chest pain.   Genitourinary: Negative for dysuria, pelvic pain, vaginal bleeding and vaginal discharge.      OBJECTIVE:     /80   Pulse 94   Temp 98.1  F (36.7  C) (Tympanic)   Resp 16   Ht 1.524 m (5')   Wt 42.6 kg (94 lb)   SpO2 97%   BMI 18.36 kg/m    Body mass index is 18.36 kg/m .  Physical Exam  Constitutional:       General: She is not in acute distress.     Appearance: Normal appearance. She is not ill-appearing.   Cardiovascular:      Rate and Rhythm: Normal rate and regular rhythm.   Pulmonary:      Effort: Pulmonary effort is normal.      Breath sounds: Normal breath sounds. No wheezing, rhonchi or rales.    Genitourinary:     Exam position: Lithotomy position.      Labia:         Right: No rash or lesion.         Left: No rash or lesion.       Vagina: Normal.      Cervix: No cervical motion tenderness, friability, lesion or erythema.      Uterus: Normal.       Adnexa:         Right: No mass, tenderness or fullness.          Left: No mass, tenderness or fullness.     Skin:     Comments: Callous present between fifth and fourth toes on left foot.  Tender to palpation.  No erythema, swelling, or drainage.   Neurological:      Mental Status: She is alert.       ASSESSMENT/PLAN:     1. Chronic obstructive pulmonary disease, unspecified COPD type (H)  Well-controlled on current medication regimen without adverse effects.  Continue without adjustment.  She denies need for refills at this time.    2. LISA (generalized anxiety disorder)  Improved on medication but feels she would benefit from a higher dose.  Increase to Escitalopram 20 mg daily.  Plan to reassess in 4-6 weeks.  - escitalopram (LEXAPRO) 20 MG tablet; Take 1 tablet (20 mg) by mouth daily  Dispense: 90 tablet; Refill: 1    3. Corn or callus  Will refer for podiatry evaluation and further management.  - Orthopedic & Spine  Referral; Future    4. Screening for malignant neoplasm of cervix  - Pap Screen Thin Prep with HPV - recommended age 30 - 65 years (select HPV order below)  - HPV High Risk Types DNA Cervical      DO [facility]

## 2020-11-05 ASSESSMENT — ANXIETY QUESTIONNAIRES: GAD7 TOTAL SCORE: 4

## 2020-11-06 PROCEDURE — G0123 SCREEN CERV/VAG THIN LAYER: HCPCS | Performed by: FAMILY MEDICINE

## 2020-11-10 DIAGNOSIS — S42.255D CLOSED NONDISPLACED FRACTURE OF GREATER TUBEROSITY OF LEFT HUMERUS WITH ROUTINE HEALING, SUBSEQUENT ENCOUNTER: Primary | ICD-10-CM

## 2020-11-10 LAB
COPATH REPORT: NORMAL
PAP: NORMAL

## 2020-11-16 ENCOUNTER — OFFICE VISIT (OUTPATIENT)
Dept: ORTHOPEDICS | Facility: OTHER | Age: 55
End: 2020-11-16
Attending: ORTHOPAEDIC SURGERY
Payer: COMMERCIAL

## 2020-11-16 ENCOUNTER — HOSPITAL ENCOUNTER (OUTPATIENT)
Dept: GENERAL RADIOLOGY | Facility: OTHER | Age: 55
End: 2020-11-16
Attending: ORTHOPAEDIC SURGERY
Payer: COMMERCIAL

## 2020-11-16 DIAGNOSIS — S42.295D OTHER CLOSED NONDISPLACED FRACTURE OF PROXIMAL END OF LEFT HUMERUS WITH ROUTINE HEALING, SUBSEQUENT ENCOUNTER: Primary | ICD-10-CM

## 2020-11-16 DIAGNOSIS — F41.1 GAD (GENERALIZED ANXIETY DISORDER): ICD-10-CM

## 2020-11-16 DIAGNOSIS — S42.255D CLOSED NONDISPLACED FRACTURE OF GREATER TUBEROSITY OF LEFT HUMERUS WITH ROUTINE HEALING, SUBSEQUENT ENCOUNTER: ICD-10-CM

## 2020-11-16 LAB
FINAL DIAGNOSIS: NORMAL
HPV HR 12 DNA CVX QL NAA+PROBE: NEGATIVE
HPV16 DNA SPEC QL NAA+PROBE: NEGATIVE
HPV18 DNA SPEC QL NAA+PROBE: NEGATIVE
SPECIMEN DESCRIPTION: NORMAL
SPECIMEN SOURCE CVX/VAG CYTO: NORMAL

## 2020-11-16 PROCEDURE — 73030 X-RAY EXAM OF SHOULDER: CPT | Mod: LT

## 2020-11-16 PROCEDURE — 99024 POSTOP FOLLOW-UP VISIT: CPT | Performed by: PHYSICIAN ASSISTANT

## 2020-11-16 PROCEDURE — G0463 HOSPITAL OUTPT CLINIC VISIT: HCPCS

## 2020-11-16 NOTE — PROGRESS NOTES
Chief Complaint   Patient presents with     RECHECK     left shoulder pain     Marsha Isidro LPN

## 2020-11-16 NOTE — PROGRESS NOTES
Visit Date:   2020      HISTORY OF PRESENT ILLNESS:  Megan returns for followup following a presumed left greater tuberosity fracture.  Her original date of injury was 10/10/2020.  She was following up with [facility] who placed her in a shoulder immobilizer.  Since she wanted to find care closer to home, we ended up taking care of her.  Today, she is complaining of minimal pain.  She comes in today in the shoulder immobilizer.      PHYSICAL EXAMINATION:  A 55-year-old female in no acute distress.  She is not put through range of motion today.      IMAGING:  Plain x-rays were reviewed today which reveal proximal humerus fracture with some possible involvement of the greater tuberosity.      IMPRESSION AND PLAN:  This is a 55-year-old female with a proximal humerus fracture.  We are going to keep her in the shoulder immobilizer for another 2-3 weeks and she is a smoker.  She will begin Codman's exercises.  We will repeat an x-ray at her next followup appointment.  We will not start physical therapy until there is enough consolidation.  She will give us a call if she has any questions.         RONAL SCHROEDER MD       As dictated by ANDREE ESPINOZA PA-C            D: 2020   T: 2020   MT: ROSA      Name:     MEGAN TIRADO   MRN:      [id number]        Account:      [id number]   :      1965           Visit Date:   2020      Document: [id number]

## 2020-11-17 RX ORDER — ESCITALOPRAM OXALATE 10 MG/1
TABLET ORAL
Qty: 30 TABLET | Refills: 1 | OUTPATIENT
Start: 2020-11-17

## 2020-11-17 NOTE — TELEPHONE ENCOUNTER
[facility] sent Rx request for the following:      Requested Prescriptions   Pending Prescriptions Disp Refills   escitalopram (LEXAPRO) 10 MG tablet [Pharmacy Med Name: ESCITALOPRAM 10MG TABLETS] 30 tablet 1    Sig: TAKE 1 TABLET(10 MG) BY MOUTH DAILY   Last Prescription Date:   8/28/20  Last Fill Qty/Refills:         30, R-1 (End: 10/30/20)    Last Office Visit:              11/4/20  Future Office visit:           None  Not on active medication list. Discontinued 10/30/20; discontinued 10/30/20; reordered as:  escitalopram (LEXAPRO) 20 MG tablet 90 tablet 1 11/4/2020  No   Sig - Route: Take 1 tablet (20 mg) by mouth daily - Oral     Refill request refused, with note to pharmacy. Unable to complete prescription refill per RN Medication Refill Policy. Janna Beverly RN .............. 11/17/2020  2:20 PM

## 2020-11-25 DIAGNOSIS — S42.295D OTHER CLOSED NONDISPLACED FRACTURE OF PROXIMAL END OF LEFT HUMERUS WITH ROUTINE HEALING, SUBSEQUENT ENCOUNTER: Primary | ICD-10-CM

## 2020-11-30 ENCOUNTER — OFFICE VISIT (OUTPATIENT)
Dept: ORTHOPEDICS | Facility: OTHER | Age: 55
End: 2020-11-30
Attending: ORTHOPAEDIC SURGERY
Payer: COMMERCIAL

## 2020-11-30 ENCOUNTER — HOSPITAL ENCOUNTER (OUTPATIENT)
Dept: GENERAL RADIOLOGY | Facility: OTHER | Age: 55
End: 2020-11-30
Attending: ORTHOPAEDIC SURGERY
Payer: COMMERCIAL

## 2020-11-30 DIAGNOSIS — S42.295D OTHER CLOSED NONDISPLACED FRACTURE OF PROXIMAL END OF LEFT HUMERUS WITH ROUTINE HEALING, SUBSEQUENT ENCOUNTER: Primary | ICD-10-CM

## 2020-11-30 DIAGNOSIS — S42.295D OTHER CLOSED NONDISPLACED FRACTURE OF PROXIMAL END OF LEFT HUMERUS WITH ROUTINE HEALING, SUBSEQUENT ENCOUNTER: ICD-10-CM

## 2020-11-30 PROCEDURE — G0463 HOSPITAL OUTPT CLINIC VISIT: HCPCS

## 2020-11-30 PROCEDURE — 99024 POSTOP FOLLOW-UP VISIT: CPT | Performed by: ORTHOPAEDIC SURGERY

## 2020-11-30 PROCEDURE — 73030 X-RAY EXAM OF SHOULDER: CPT | Mod: LT

## 2020-11-30 NOTE — PROGRESS NOTES
Chief Complaint   Patient presents with     RECHECK     s/p 7 weeks - left proximal humerus fx     Marsha Isidro, CRYSTAL

## 2020-11-30 NOTE — PROGRESS NOTES
Visit Date:   2020      HISTORY OF PRESENT ILLNESS:  She is a 55-year-old female with a proximal humerus fracture of the left shoulder.  She was told that she had a greater tuberosity fracture by [facility] and I was asked to followup with her here.  She comes in today.  She was in a shoulder immobilizer.  She is 7 weeks out.  We had discovered after she followed up with us that she actually had a surgical neck of the humerus fracture and not a greater tuberosity fracture.  She has healed quite nicely actually at this point.      X-rays today reveal that she has no significant displacement of this and good interval healing.      PHYSICAL EXAMINATION:  on exam today, she is not really put through a range of motion secondary to discomfort and weakness in the rotator cuff, but she does have about 60 degrees of abduction with the shoulder today.  It appears completely benign at this point with no significant signs of trauma.      IMPRESSION AND PLAN:  This is a 55-year-old female who is now 7 weeks status post left proximal humerus fracture.  We watched her a little bit longer with this because she is a smoker.  She is doing very well otherwise.  We are going to give her physical therapy for this, get her rotator cuff strength and her range of motion back and work on her scapular stabilization.  I will see her back on an as-needed basis if she has any further problems.         RONAL SCHROEDER MD             D: 2020   T: 2020   MT: ROSA      Name:     JIMENA TIRADO   MRN:      [id number]        Account:      [id number]   :      1965           Visit Date:   2020      Document: [id number]

## 2021-01-27 ENCOUNTER — TELEPHONE (OUTPATIENT)
Dept: FAMILY MEDICINE | Facility: OTHER | Age: 56
End: 2021-01-27

## 2021-01-27 NOTE — TELEPHONE ENCOUNTER
Patient would like a letter to go back to work for 2 days a week. Please call    Katia Suárez on 1/27/2021 at 10:40 AM

## 2021-01-28 ENCOUNTER — TELEPHONE (OUTPATIENT)
Dept: FAMILY MEDICINE | Facility: OTHER | Age: 56
End: 2021-01-28

## 2021-01-28 NOTE — TELEPHONE ENCOUNTER
I have not seen her for her shoulder injury.  Recommendation for her return to work should probably come from orthopedics.

## 2021-01-28 NOTE — TELEPHONE ENCOUNTER
Patient is stating she would like a note saying she can return to work a couple of days a week with a mask.  This is regarding her COPD which she states Dr Veronica has seen her for    Please call to discuss    Thank you

## 2021-01-28 NOTE — LETTER
[facility] AND [facility]  [address]  [place] [address]  Phone: [phone]  Fax: [phone]    01/29/21    Megan Cash  [address] [place] [id number]      To whom it may concern:     Please allow Megan Cash to return to work, while wearing a mask, for 6 hour shifts 2-3 times per week.    Sincerely,      Elisabeth Veronica, DO

## 2021-01-28 NOTE — TELEPHONE ENCOUNTER
[facility] will contact patient. She hasn't been seen for her shoulder since 11/30/20  Shantelle Barragan LPN .....................1/28/2021 1:01 PM

## 2021-01-29 NOTE — TELEPHONE ENCOUNTER
Please call today.  Patient is at [phone].  She would like a note to go back to work.  Work Fax# [phone].          Kathy Hardin on 1/29/2021 at 9:46 AM

## 2021-06-25 DIAGNOSIS — B18.2 HEP C W/O COMA, CHRONIC (H): ICD-10-CM

## 2021-06-25 LAB
ALBUMIN SERPL-MCNC: 4.3 G/DL (ref 3.5–5.7)
ALP SERPL-CCNC: 53 U/L (ref 34–104)
ALT SERPL W P-5'-P-CCNC: 11 U/L (ref 7–52)
AST SERPL W P-5'-P-CCNC: 22 U/L (ref 13–39)
BILIRUB DIRECT SERPL-MCNC: 0.2 MG/DL (ref 0–0.2)
BILIRUB SERPL-MCNC: 0.5 MG/DL (ref 0.3–1)
PROT SERPL-MCNC: 7.7 G/DL (ref 6.4–8.9)

## 2021-06-25 PROCEDURE — 36415 COLL VENOUS BLD VENIPUNCTURE: CPT | Mod: ZL | Performed by: NURSE PRACTITIONER

## 2021-06-25 PROCEDURE — 80076 HEPATIC FUNCTION PANEL: CPT | Mod: ZL | Performed by: NURSE PRACTITIONER

## 2021-06-25 PROCEDURE — 87522 HEPATITIS C REVRS TRNSCRPJ: CPT | Mod: ZL | Performed by: NURSE PRACTITIONER

## 2021-06-28 LAB
HCV RNA SERPL NAA+PROBE-ACNC: NORMAL [IU]/ML
HCV RNA SERPL NAA+PROBE-LOG IU: NORMAL LOG IU/ML

## 2021-07-24 ENCOUNTER — HEALTH MAINTENANCE LETTER (OUTPATIENT)
Age: 56
End: 2021-07-24

## 2021-08-10 ENCOUNTER — TELEPHONE (OUTPATIENT)
Dept: FAMILY MEDICINE | Facility: OTHER | Age: 56
End: 2021-08-10

## 2021-08-10 NOTE — TELEPHONE ENCOUNTER
The patient had questions about her work note.  She would like to talk about the hours she can work

## 2021-08-10 NOTE — TELEPHONE ENCOUNTER
Patient is requesting a letter stating that she can work 3-4 days weekly/ 8 hour shifts. Thank You   Please fax to [phone]  Oxana Moreno LPN ....................  8/10/2021   3:57 PM

## 2021-08-10 NOTE — LETTER
[facility] AND [facility]  [address]  [place] [address]  Phone: [phone]  Fax: [phone]    08/20/21    Megan Cash  [address] [place] [id number]      To whom it may concern:     Megan Cash may work 8-10 hours per day, 3-4 days per week.    Sincerely,      Elisabeth Veronica, DO

## 2021-08-13 NOTE — TELEPHONE ENCOUNTER
Dr. Veronica is out until Monday. Will you address this letter?  Norma J. Gosselin, LPN .......  8/13/2021  10:17 AM

## 2021-08-13 NOTE — TELEPHONE ENCOUNTER
Patient called back.  She would like to get that letter today if possible.  She would like the note to say she can work 3-4 days a week 8 hour days.         Kathy Hardin on 8/13/2021 at 8:54 AM

## 2021-08-13 NOTE — TELEPHONE ENCOUNTER
Can wait for PCP as it appears PCP has required patient to contact her orthopedics provider for workability note in the past and has not followed up with PCP or any other provider in our clinic since 01/2021.     Annalise Knowles PA-C on 8/13/2021 at 10:29 AM

## 2021-08-19 NOTE — TELEPHONE ENCOUNTER
Left message to call back....................  8/19/2021   4:14 PM  Jennifer Lagunas LPN 8/19/2021   4:14 PM

## 2021-08-19 NOTE — TELEPHONE ENCOUNTER
Left message to call back....................  8/19/2021   12:10 PM  Jennifer Lagunas LPN 8/19/2021   12:10 PM

## 2021-08-20 NOTE — TELEPHONE ENCOUNTER
Called and verified patient full name and . Patient needs letter for employer ([employer]) stating that she can work 3-4 days per week, 8-10 hours per day. No other restrictions. Does have office visit with you on .     Fax number is [phone]     Melinda Holcomb LPN on 2021 at 2:47 PM

## 2021-08-26 ENCOUNTER — OFFICE VISIT (OUTPATIENT)
Dept: FAMILY MEDICINE | Facility: OTHER | Age: 56
End: 2021-08-26
Attending: FAMILY MEDICINE
Payer: COMMERCIAL

## 2021-08-26 ENCOUNTER — HOSPITAL ENCOUNTER (OUTPATIENT)
Dept: MAMMOGRAPHY | Facility: OTHER | Age: 56
End: 2021-08-26
Attending: FAMILY MEDICINE
Payer: COMMERCIAL

## 2021-08-26 VITALS
TEMPERATURE: 97.9 F | WEIGHT: 95 LBS | OXYGEN SATURATION: 95 % | HEIGHT: 60 IN | RESPIRATION RATE: 16 BRPM | SYSTOLIC BLOOD PRESSURE: 110 MMHG | BODY MASS INDEX: 18.65 KG/M2 | HEART RATE: 84 BPM | DIASTOLIC BLOOD PRESSURE: 74 MMHG

## 2021-08-26 DIAGNOSIS — Z11.4 ENCOUNTER FOR SCREENING FOR HIV: ICD-10-CM

## 2021-08-26 DIAGNOSIS — J44.9 CHRONIC OBSTRUCTIVE PULMONARY DISEASE, UNSPECIFIED COPD TYPE (H): Primary | ICD-10-CM

## 2021-08-26 DIAGNOSIS — Z12.31 VISIT FOR SCREENING MAMMOGRAM: ICD-10-CM

## 2021-08-26 DIAGNOSIS — F41.1 GAD (GENERALIZED ANXIETY DISORDER): ICD-10-CM

## 2021-08-26 DIAGNOSIS — Z87.891 PERSONAL HISTORY OF TOBACCO USE: ICD-10-CM

## 2021-08-26 PROBLEM — B18.2 HEPATITIS C, CHRONIC (H): Status: ACTIVE | Noted: 2021-08-26

## 2021-08-26 PROCEDURE — 77063 BREAST TOMOSYNTHESIS BI: CPT

## 2021-08-26 PROCEDURE — 36415 COLL VENOUS BLD VENIPUNCTURE: CPT | Mod: ZL | Performed by: FAMILY MEDICINE

## 2021-08-26 PROCEDURE — G0463 HOSPITAL OUTPT CLINIC VISIT: HCPCS

## 2021-08-26 PROCEDURE — G0296 VISIT TO DETERM LDCT ELIG: HCPCS | Performed by: FAMILY MEDICINE

## 2021-08-26 PROCEDURE — 87389 HIV-1 AG W/HIV-1&-2 AB AG IA: CPT | Mod: ZL | Performed by: FAMILY MEDICINE

## 2021-08-26 PROCEDURE — 99214 OFFICE O/P EST MOD 30 MIN: CPT | Performed by: FAMILY MEDICINE

## 2021-08-26 RX ORDER — ESCITALOPRAM OXALATE 20 MG/1
20 TABLET ORAL DAILY
Qty: 90 TABLET | Refills: 1 | Status: SHIPPED | OUTPATIENT
Start: 2021-08-26 | End: 2023-11-13

## 2021-08-26 RX ORDER — ALBUTEROL SULFATE 90 UG/1
AEROSOL, METERED RESPIRATORY (INHALATION)
Qty: 18 G | Refills: 11 | Status: SHIPPED | OUTPATIENT
Start: 2021-08-26 | End: 2023-11-13

## 2021-08-26 ASSESSMENT — PATIENT HEALTH QUESTIONNAIRE - PHQ9
5. POOR APPETITE OR OVEREATING: NOT AT ALL
SUM OF ALL RESPONSES TO PHQ QUESTIONS 1-9: 3

## 2021-08-26 ASSESSMENT — ANXIETY QUESTIONNAIRES
6. BECOMING EASILY ANNOYED OR IRRITABLE: SEVERAL DAYS
1. FEELING NERVOUS, ANXIOUS, OR ON EDGE: SEVERAL DAYS
GAD7 TOTAL SCORE: 5
5. BEING SO RESTLESS THAT IT IS HARD TO SIT STILL: SEVERAL DAYS
2. NOT BEING ABLE TO STOP OR CONTROL WORRYING: SEVERAL DAYS
3. WORRYING TOO MUCH ABOUT DIFFERENT THINGS: SEVERAL DAYS
7. FEELING AFRAID AS IF SOMETHING AWFUL MIGHT HAPPEN: NOT AT ALL
IF YOU CHECKED OFF ANY PROBLEMS ON THIS QUESTIONNAIRE, HOW DIFFICULT HAVE THESE PROBLEMS MADE IT FOR YOU TO DO YOUR WORK, TAKE CARE OF THINGS AT HOME, OR GET ALONG WITH OTHER PEOPLE: SOMEWHAT DIFFICULT

## 2021-08-26 ASSESSMENT — ENCOUNTER SYMPTOMS
CHILLS: 0
FEVER: 0

## 2021-08-26 ASSESSMENT — PAIN SCALES - GENERAL: PAINLEVEL: NO PAIN (0)

## 2021-08-26 ASSESSMENT — MIFFLIN-ST. JEOR: SCORE: 937.42

## 2021-08-26 NOTE — NURSING NOTE
Chief Complaint   Patient presents with     Recheck Medication         Initial /74   Pulse 84   Temp 97.9  F (36.6  C) (Temporal)   Resp 16   Ht 1.524 m (5')   Wt 43.1 kg (95 lb)   SpO2 95%   BMI 18.55 kg/m   Estimated body mass index is 18.55 kg/m  as calculated from the following:    Height as of this encounter: 1.524 m (5').    Weight as of this encounter: 43.1 kg (95 lb).     FOOD SECURITY SCREENING QUESTIONS  Hunger Vital Signs:  Within the past 12 months we worried whether our food would run out before we got money to buy more. Never  Within the past 12 months the food we bought just didn't last and we didn't have money to get more. Never      Medication Reconciliation: Complete      Norma J. Gosselin, LPN

## 2021-08-26 NOTE — PROGRESS NOTES
Assessment & Plan     Chronic obstructive pulmonary disease, unspecified COPD type (H)  Controlled on current regiment.  Continue without adjustment.  Refills provided.  - mometasone-formoterol (DULERA) 100-5 MCG/ACT inhaler; Inhale 2 puffs into the lungs 2 times daily  - albuterol (PROAIR HFA/PROVENTIL HFA/VENTOLIN HFA) 108 (90 Base) MCG/ACT inhaler; INHALE 2 PUFFS INTO THE LUNGS EVERY 4 HOURS AS NEEDED FOR SHORTNESS OF BREATH OR DIFFICULT BREATHING OR WHEEZING  - tiotropium (SPIRIVA RESPIMAT) 2.5 MCG/ACT inhaler; Inhale 2 puffs into the lungs daily    LISA (generalized anxiety disorder)  Symptoms improved on medication regimen.  Continue without adjustment.  Refill provided.  Encouraged continued participation in counseling.  - escitalopram (LEXAPRO) 20 MG tablet; Take 1 tablet (20 mg) by mouth daily    Encounter for screening for HIV  - HIV Antigen Antibody Combo    Personal history of tobacco use  - Prof fee: Shared Decisionmaking for Lung Cancer Screening  - CT Chest Lung Cancer Scrn Low Dose wo; Future    Elisabeth Veronica DO  [facility]    Radha Guzman is a 56 year old who presents for the following health issues:    HPI     LISA:  Currently managed on Escitalopram which she has been taking without complications or adverse effects.  She reports that her sleep continues to be better on the medication.  Her panic attacks are fewer and less mind racing.  She has also begun seeing a therapist and reports that this has helped.    COPD:  Currently managed Dulera and Spiriva with Albuterol inhaler as needed.  She reports needing the Albuterol inhaler only when being physical active with her dog and grandchildren or when working in her garden.  She estimates that she uses it 2-3 times per week.  She has been slowly ramping up the amount of time that she has been spending at work, though she feels like the mask affects her negatively.    Review of Systems   Constitutional: Negative for chills  and fever.          Objective    /74   Pulse 84   Temp 97.9  F (36.6  C) (Temporal)   Resp 16   Ht 1.524 m (5')   Wt 43.1 kg (95 lb)   SpO2 95%   BMI 18.55 kg/m    Body mass index is 18.55 kg/m .  Physical Exam  Constitutional:       General: She is not in acute distress.     Appearance: Normal appearance. She is not ill-appearing.   Cardiovascular:      Rate and Rhythm: Normal rate and regular rhythm.      Heart sounds: No murmur heard.   No friction rub. No gallop.    Pulmonary:      Effort: Pulmonary effort is normal.      Comments: Diminished breat sounds over lung base bilaterally.  Neurological:      Mental Status: She is alert.   Psychiatric:         Mood and Affect: Mood normal.      Lung Cancer Screening Shared Decision Making Visit     Megan Cash is eligible for lung cancer screening on the basis of the information provided in my signed lung cancer screening order.     I have discussed with patient the risks and benefits of screening for lung cancer with low-dose CT.     The risks include:  radiation exposure: one low dose chest CT has as much ionizing radiation as about 15 chest x-rays or 6 months of background radiation living in [place]    false positives: 96% of positive findings/nodules are NOT cancer, but some might still require additional diagnostic evaluation, including biopsy  over-diagnosis: some slow growing cancers that might never have been clinically significant will be detected and treated unnecessarily     The benefit of early detection of lung cancer is contingent upon adherence to annual screening or more frequent follow up if indicated.     Furthermore, reaping the benefits of screening requires Megan Cash to be willing and physically able to undergo diagnostic procedures, if indicated. Although no specific guide is available for determining severity of comorbidities, it is reasonable to withhold screening in patients who have greater mortality risk from other  diseases.     We did discuss that the only way to prevent lung cancer is to not smoke. Smoking cessation counseling was not given.      I did not offer risk estimation using a calculator such as this one:    ShouldIScreen

## 2021-08-26 NOTE — PATIENT INSTRUCTIONS

## 2021-08-27 LAB — HIV 1+2 AB+HIV1 P24 AG SERPL QL IA: NONREACTIVE

## 2021-08-27 ASSESSMENT — ANXIETY QUESTIONNAIRES: GAD7 TOTAL SCORE: 5

## 2021-09-18 ENCOUNTER — HEALTH MAINTENANCE LETTER (OUTPATIENT)
Age: 56
End: 2021-09-18

## 2022-08-20 ENCOUNTER — HEALTH MAINTENANCE LETTER (OUTPATIENT)
Age: 57
End: 2022-08-20

## 2022-11-20 ENCOUNTER — HEALTH MAINTENANCE LETTER (OUTPATIENT)
Age: 57
End: 2022-11-20

## 2023-03-25 ENCOUNTER — APPOINTMENT (OUTPATIENT)
Dept: GENERAL RADIOLOGY | Facility: OTHER | Age: 58
End: 2023-03-25
Attending: EMERGENCY MEDICINE
Payer: OTHER MISCELLANEOUS

## 2023-03-25 ENCOUNTER — HOSPITAL ENCOUNTER (EMERGENCY)
Facility: OTHER | Age: 58
Discharge: HOME | End: 2023-03-25
Attending: EMERGENCY MEDICINE | Admitting: EMERGENCY MEDICINE
Payer: OTHER MISCELLANEOUS

## 2023-03-25 VITALS
HEIGHT: 60 IN | TEMPERATURE: 97.2 F | RESPIRATION RATE: 18 BRPM | OXYGEN SATURATION: 95 % | BODY MASS INDEX: 18.85 KG/M2 | DIASTOLIC BLOOD PRESSURE: 85 MMHG | SYSTOLIC BLOOD PRESSURE: 154 MMHG | WEIGHT: 96 LBS | HEART RATE: 85 BPM

## 2023-03-25 DIAGNOSIS — S83.92XA SPRAIN OF LEFT KNEE, UNSPECIFIED LIGAMENT, INITIAL ENCOUNTER: ICD-10-CM

## 2023-03-25 PROCEDURE — 73562 X-RAY EXAM OF KNEE 3: CPT | Mod: LT

## 2023-03-25 PROCEDURE — 96372 THER/PROPH/DIAG INJ SC/IM: CPT | Performed by: EMERGENCY MEDICINE

## 2023-03-25 PROCEDURE — 99284 EMERGENCY DEPT VISIT MOD MDM: CPT | Performed by: EMERGENCY MEDICINE

## 2023-03-25 PROCEDURE — 250N000011 HC RX IP 250 OP 636: Performed by: EMERGENCY MEDICINE

## 2023-03-25 PROCEDURE — 99283 EMERGENCY DEPT VISIT LOW MDM: CPT | Performed by: EMERGENCY MEDICINE

## 2023-03-25 RX ORDER — KETOROLAC TROMETHAMINE 30 MG/ML
30 INJECTION, SOLUTION INTRAMUSCULAR; INTRAVENOUS ONCE
Status: COMPLETED | OUTPATIENT
Start: 2023-03-25 | End: 2023-03-25

## 2023-03-25 RX ADMIN — KETOROLAC TROMETHAMINE 30 MG: 30 INJECTION, SOLUTION INTRAMUSCULAR at 10:02

## 2023-03-25 ASSESSMENT — ENCOUNTER SYMPTOMS
DYSURIA: 0
LIGHT-HEADEDNESS: 0
CHILLS: 0
NAUSEA: 0
FEVER: 0
VOMITING: 0
AGITATION: 0
ARTHRALGIAS: 1
SHORTNESS OF BREATH: 0
CHEST TIGHTNESS: 0

## 2023-03-25 NOTE — ED PROVIDER NOTES
History     Chief Complaint   Patient presents with     Knee Pain     HPI  Megan Cash is a 58 year old female who is here with knee pain.  She was at work when she tripped and fell onto the ground.  She said she landed directly on the left knee.  Has a little bit of swelling below the kneecap.  When she is just sitting it does not hurt too bad but she can really cannot walk on it without severe pain.  No numbness tingling weakness    Allergies:  No Known Allergies    Problem List:    Patient Active Problem List    Diagnosis Date Noted     Hepatitis C, chronic (H) 08/26/2021     Priority: Medium     Adenomatous colon polyp 05/18/2016     Priority: Medium     PCR positive for hepatitis C virus 05/11/2016     Priority: Medium     Alcoholic (H) 05/03/2016     Priority: Medium     Alcohol abuse, daily use 05/02/2016     Priority: Medium     Full dentures 05/02/2016     Priority: Medium     Smoker 05/02/2016     Priority: Medium        Past Medical History:    Past Medical History:   Diagnosis Date     Nicotine dependence, uncomplicated      Presence of dental prosthetic device      Uncomplicated alcohol abuse      Viral hepatitis C without hepatic coma        Past Surgical History:    Past Surgical History:   Procedure Laterality Date     COLONOSCOPY  05/16/2016 5/16/16,[id number],COLONOSCOPY W/ BIOPSIES,colon polyps-repeat in 3 years     COLONOSCOPY N/A 7/7/2020    2 small tubular adenomas.  Follow up 2025     CONIZATION LEEP      10/28/2010,Had YAHAIRA 1 but wanted something done     LAPAROSCOPIC TUBAL LIGATION      1988     OTHER SURGICAL HISTORY      [id number],OTHER,Full dentures       Family History:    Family History   Problem Relation Age of Onset     Other - See Comments Father         copd     Breast Cancer Sister 50        Cancer-breast     Diabetes Sister         Diabetes     Diabetes Paternal Grandfather         Diabetes       Social History:  Marital Status:   [4]  Social History     Tobacco Use      Smoking status: Every Day     Packs/day: 0.75     Years: 40.00     Pack years: 30.00     Types: Cigarettes     Smokeless tobacco: Never   Vaping Use     Vaping Use: Never used   Substance Use Topics     Alcohol use: Yes     Alcohol/week: 32.0 standard drinks     Types: 32 Cans of beer per week     Comment: Alcoholic Drinks/day: 4-6 beer a day     Drug use: Not Currently     Types: Methamphetamines     Comment: quit meth for 16 years as of today 6/8/20        Medications:    albuterol (PROAIR HFA/PROVENTIL HFA/VENTOLIN HFA) 108 (90 Base) MCG/ACT inhaler  escitalopram (LEXAPRO) 20 MG tablet  mometasone-formoterol (DULERA) 100-5 MCG/ACT inhaler  tiotropium (SPIRIVA RESPIMAT) 2.5 MCG/ACT inhaler          Review of Systems   Constitutional: Negative for chills and fever.   HENT: Negative for congestion.    Eyes: Negative for visual disturbance.   Respiratory: Negative for chest tightness and shortness of breath.    Cardiovascular: Negative for chest pain.   Gastrointestinal: Negative for nausea and vomiting.   Genitourinary: Negative for dysuria.   Musculoskeletal: Positive for arthralgias.   Skin: Negative for rash.   Neurological: Negative for light-headedness.   Psychiatric/Behavioral: Negative for agitation.       Physical Exam   BP: (!) 154/85  Pulse: 85  Temp: 97.2  F (36.2  C)  Resp: 18  Height: 152.4 cm (5')  Weight: 43.5 kg (96 lb)  SpO2: 95 %      Physical Exam  Vitals and nursing note reviewed.   Constitutional:       Appearance: Normal appearance.   HENT:      Head: Normocephalic and atraumatic.      Mouth/Throat:      Mouth: Mucous membranes are moist.   Eyes:      Conjunctiva/sclera: Conjunctivae normal.   Cardiovascular:      Rate and Rhythm: Normal rate.   Pulmonary:      Effort: Pulmonary effort is normal.   Abdominal:      General: Abdomen is flat.   Musculoskeletal:      Comments: She does have some infrapatellar effusion.  Mildly tender to palpation.  No tenderness to palpation over the medial or  lateral joint space.  No fullness in the popliteal fossa.  No joint laxity with varus or valgus stressing.  Negative Lachman's.   Skin:     General: Skin is dry.   Neurological:      Mental Status: She is alert and oriented to person, place, and time.   Psychiatric:         Behavior: Behavior normal.         ED Course                 Procedures           Results for orders placed or performed during the hospital encounter of 03/25/23 (from the past 24 hour(s))   XR Knee Left 3 Views    Narrative    PROCEDURE:  XR KNEE LEFT 3 VIEWS    HISTORY: fell on knee and painful.    COMPARISON:  None.    TECHNIQUE:  3 views left knee.    FINDINGS:  No fracture or dislocation is identified. Question  osteopenia. The joint spaces are preserved. No foreign body is seen.       Impression    IMPRESSION: No acute fracture.      GELY DICKERSON MD         SYSTEM ID:  [id number]       Medications   ketorolac (TORADOL) injection 30 mg (30 mg Intramuscular $Given 3/25/23 1002)       Assessments & Plan (with Medical Decision Making)     I have reviewed the nursing notes.    I have reviewed the findings, diagnosis, plan and need for follow up with the patient.  Patient with left knee pain and infrapatellar effusion.  X-ray reassuring.  Cannot rule out meniscal injury, however ligaments appear intact and nonpainful on exam.  She is given a knee immobilizer and if needed some crutches.  She should use ice elevation ibuprofen.  Follow-up in clinic if not improving in 1 to 2 weeks.        New Prescriptions    No medications on file       Final diagnoses:   Sprain of left knee, unspecified ligament, initial encounter       3/25/2023   [facility] AND [facility]     Zachery Barnard MD  03/25/23 [id number]

## 2023-03-25 NOTE — Clinical Note
Megan Cash was seen and treated in our emergency department on 3/25/2023.  She may return to work on 03/31/2023.       If you have any questions or concerns, please don't hesitate to call.      Zachery Barnard MD

## 2023-03-25 NOTE — ED TRIAGE NOTES
Pt here with family, pt reports that she tripped and fell at work and landed on her left knee, pt reports difficulty ambulating on this, VSS, pt into bay 6 via w.c     Triage Assessment     Row Name 03/25/23 3565       Triage Assessment (Adult)    Airway WDL WDL       Respiratory WDL    Respiratory WDL WDL       Skin Circulation/Temperature WDL    Skin Circulation/Temperature WDL WDL       Cardiac WDL    Cardiac WDL WDL       Peripheral/Neurovascular WDL    Peripheral Neurovascular WDL WDL       Cognitive/Neuro/Behavioral WDL    Cognitive/Neuro/Behavioral WDL WDL

## 2023-03-30 ENCOUNTER — OFFICE VISIT (OUTPATIENT)
Dept: FAMILY MEDICINE | Facility: OTHER | Age: 58
End: 2023-03-30
Attending: CHIROPRACTOR
Payer: OTHER MISCELLANEOUS

## 2023-03-30 VITALS
SYSTOLIC BLOOD PRESSURE: 120 MMHG | OXYGEN SATURATION: 97 % | WEIGHT: 95 LBS | HEIGHT: 61 IN | HEART RATE: 86 BPM | RESPIRATION RATE: 16 BRPM | DIASTOLIC BLOOD PRESSURE: 81 MMHG | BODY MASS INDEX: 17.94 KG/M2 | TEMPERATURE: 98 F
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
## 2023-03-30 DIAGNOSIS — M25.662 KNEE STIFFNESS, LEFT: ICD-10-CM

## 2023-03-30 DIAGNOSIS — S89.92XA KNEE INJURY, LEFT, INITIAL ENCOUNTER: ICD-10-CM

## 2023-03-30 PROCEDURE — 99213 OFFICE O/P EST LOW 20 MIN: CPT | Performed by: CHIROPRACTOR

## 2023-03-30 ASSESSMENT — PAIN SCALES - GENERAL: PAINLEVEL: MODERATE PAIN (4)

## 2023-03-30 NOTE — PROGRESS NOTES
CHIEF COMPLAINT:   Chief Complaint   Patient presents with     Work Comp       HISTORY OF PRESENTING WORK INJURY     Patient works for [employer] in [facility].  On March 25, 2023 at approximately 8 AM, she was attempting to clean the floor under a sink.  There is a half shelf located under the sink and she had a coworker pulled it out to clean the floor.  Megan later stood up and turned and tripped over this shelf, landing directly on her left knee.  She was taken to the ED where x-rays were obtained.  No fracture was indicated but possible meniscal injury was documented.  She was taken off of work until tomorrow and given a brace.    Since then, she has been resting at home applying ice, elevating, and wearing the brace as instructed.  She notes about 30 to 40% improvement since the incident, going from a 9 out of 10 pain to a 4 out of 10 pain today only when standing or using the knee.  When at rest, she notes no pain at all.  She is walking around with a cane instead of the crutches as were given to her and finds it is easier.  She denies any additional swelling and points to the meniscal area as the source of pain without radiation to the foot.  She denies any sensory or motor involvement in the foot or toes.      PAST MEDICAL HISTORY:  Past Medical History:   Diagnosis Date     Nicotine dependence, uncomplicated     No Comments Provided     Presence of dental prosthetic device     No Comments Provided     Uncomplicated alcohol abuse     No Comments Provided     Viral hepatitis C without hepatic coma     5/11/2016       PAST SURGICAL HISTORY:  Past Surgical History:   Procedure Laterality Date     COLONOSCOPY  05/16/2016 5/16/16,[id number],COLONOSCOPY W/ BIOPSIES,colon polyps-repeat in 3 years     COLONOSCOPY N/A 7/7/2020    2 small tubular adenomas.  Follow up 2025     CONIZATION LEEP      10/28/2010,Had YAHAIRA 1 but wanted something done     LAPAROSCOPIC TUBAL LIGATION      1988     OTHER SURGICAL  HISTORY      [id number],OTHER,Full dentures       ALLERGIES:  No Known Allergies    CURRENT MEDICATIONS:  Current Outpatient Medications   Medication Sig Dispense Refill     albuterol (PROAIR HFA/PROVENTIL HFA/VENTOLIN HFA) 108 (90 Base) MCG/ACT inhaler INHALE 2 PUFFS INTO THE LUNGS EVERY 4 HOURS AS NEEDED FOR SHORTNESS OF BREATH OR DIFFICULT BREATHING OR WHEEZING 18 g 11     escitalopram (LEXAPRO) 20 MG tablet Take 1 tablet (20 mg) by mouth daily 90 tablet 1     mometasone-formoterol (DULERA) 100-5 MCG/ACT inhaler Inhale 2 puffs into the lungs 2 times daily 13 g 11     tiotropium (SPIRIVA RESPIMAT) 2.5 MCG/ACT inhaler Inhale 2 puffs into the lungs daily 4 g 11       SOCIAL HISTORY:  Social History     Socioeconomic History     Marital status:      Spouse name: Not on file     Number of children: Not on file     Years of education: Not on file     Highest education level: Not on file   Occupational History     Not on file   Tobacco Use     Smoking status: Every Day     Packs/day: 0.75     Years: 40.00     Pack years: 30.00     Types: Cigarettes     Smokeless tobacco: Never   Vaping Use     Vaping Use: Never used   Substance and Sexual Activity     Alcohol use: Yes     Alcohol/week: 32.0 standard drinks     Types: 32 Cans of beer per week     Comment: Alcoholic Drinks/day: 4-6 beer a day     Drug use: Not Currently     Types: Methamphetamines     Comment: quit meth for 16 years as of today 6/8/20     Sexual activity: Yes     Partners: Male   Other Topics Concern     Parent/sibling w/ CABG, MI or angioplasty before 65F 55M? Not Asked   Social History Narrative    Moved here from [place] in 2014   but now back living with him again.  Works at [employer] as a manager  Her daughter(Pricila Harmon)lives in the [place] and son lives in [place].  Has 2 grand children and expecting another.     Social Determinants of Health     Financial Resource Strain: Not on file   Food Insecurity: Not on file  "  Transportation Needs: Not on file   Physical Activity: Not on file   Stress: Not on file   Social Connections: Not on file   Intimate Partner Violence: Not on file   Housing Stability: Not on file       FAMILY HISTORY:  Family History   Problem Relation Age of Onset     Other - See Comments Father         copd     Breast Cancer Sister 50        Cancer-breast     Diabetes Sister         Diabetes     Diabetes Paternal Grandfather         Diabetes       REVIEW OF SYSTEMS:    Skin: negative  Eyes: negative  Ears/Nose/Throat: negative  Respiratory: No shortness of breath, dyspnea on exertion, cough, or hemoptysis  Cardiovascular: negative  Gastrointestinal: negative  Genitourinary: negative  Musculoskeletal: positive for as above  Neurologic: negative  Psychiatric: negative  Hematologic/Lymphatic/Immunologic: negative  Endocrine: negative      PHYSICAL EXAM:   /81   Pulse 86   Temp 98  F (36.7  C) (Tympanic)   Resp 16   Ht 1.549 m (5' 1\")   Wt 43.1 kg (95 lb)   SpO2 97%   BMI 17.95 kg/m   Body mass index is 17.95 kg/m .    General Appearance: Pleasant, alert, appropriate appearance for age.  I had her remove the brace for the exam.  Patient is ambulatory with assistance of a cane and avoids full weightbearing on the left knee.  She also avoids full extension.    Left knee:    Palpation:    Effusion: Negative   Pain Palpated: Meniscal  Skin:   No abrasions bruising  Range of Motion Testing:   Flexion: 105 degrees (0-135) with endpoint pain   Extension: 15 (0) with endpoint pain  The rest of the exam was very limited due to her discomfort.  I was able to perform drawer test which did not indicate any ligament involvement.    Reviewed and discussed recent x-ray imaging:    Study Result    Narrative & Impression   PROCEDURE:  XR KNEE LEFT 3 VIEWS     HISTORY: fell on knee and painful.     COMPARISON:  None.     TECHNIQUE:  3 views left knee.     FINDINGS:  No fracture or dislocation is identified. " Question  osteopenia. The joint spaces are preserved. No foreign body is seen.                                                                       IMPRESSION: No acute fracture.       GELY DICKERSON MD             IMPRESSION/PLAN:    Exam is limited today and I am unable to perform a full evaluation to determine any additional soft tissue involvement beyond the ACL and PCL ligaments.  Therefore, I will extend her work restrictions with strict instruction to rest, ice, elevate, and avoid full weightbearing for 1 additional week with light movements at home.  Given her improvement from this rest from the original injury date, I anticipate her being improved and able to tolerate a full exam.  We will attempt to perform this next Wednesday and workability with no work is given to her today.  Patient agreed and signed the workability form and 2 copies were issued to her.  She had no additional questions and left appreciative.      Total time spent today in chart review/preparation: 8 minutes  Total time spent today in face to face evaluation: 16 minutes  Total time spent today in documentation: 8 minutes.        Sachin Lange DC, WARD, MICHEL  Director - Occupational Medicine Department  Diplomate of the American Board of Forensic Professionals  Board Certified - American Board of Independent Medical Examiners  [address] [address]  Phone [phone]  Fax [phone]    10:27 AM 3/30/2023

## 2023-04-05 ENCOUNTER — OFFICE VISIT (OUTPATIENT)
Dept: FAMILY MEDICINE | Facility: OTHER | Age: 58
End: 2023-04-05
Attending: CHIROPRACTOR
Payer: OTHER MISCELLANEOUS

## 2023-04-05 VITALS
DIASTOLIC BLOOD PRESSURE: 76 MMHG | OXYGEN SATURATION: 94 % | RESPIRATION RATE: 20 BRPM | HEIGHT: 61 IN | TEMPERATURE: 97.5 F | BODY MASS INDEX: 18.17 KG/M2 | WEIGHT: 96.25 LBS | HEART RATE: 95 BPM | SYSTOLIC BLOOD PRESSURE: 126 MMHG
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
## 2023-04-05 DIAGNOSIS — M25.662 KNEE STIFFNESS, LEFT: ICD-10-CM

## 2023-04-05 DIAGNOSIS — S89.92XA KNEE INJURY, LEFT, INITIAL ENCOUNTER: ICD-10-CM

## 2023-04-05 PROCEDURE — 99213 OFFICE O/P EST LOW 20 MIN: CPT | Performed by: CHIROPRACTOR

## 2023-04-05 ASSESSMENT — PAIN SCALES - GENERAL: PAINLEVEL: MILD PAIN (2)

## 2023-04-05 NOTE — NURSING NOTE
"Patient presents to the clinic for work comp follow up.    FOOD SECURITY SCREENING QUESTIONS:    The next two questions are to help us understand your food security.  If you are feeling you need any assistance in this area, we have resources available to support you today.    Hunger Vital Signs:  Within the past 12 months we worried whether our food would run out before we got money to buy more. Never  Within the past 12 months the food we bought just didn't last and we didn't have money to get more. Never    Advance Care Directive on file? no  Advance Care Directive provided to patient? Declined.    Chief Complaint   Patient presents with     Work Comp       Initial /76 (BP Location: Right arm, Patient Position: Sitting, Cuff Size: Adult Regular)   Pulse 95   Temp 97.5  F (36.4  C) (Tympanic)   Resp 20   Ht 1.549 m (5' 1\")   Wt 43.7 kg (96 lb 4 oz)   SpO2 94%   BMI 18.19 kg/m   Estimated body mass index is 18.19 kg/m  as calculated from the following:    Height as of this encounter: 1.549 m (5' 1\").    Weight as of this encounter: 43.7 kg (96 lb 4 oz).  Medication Reconciliation: complete        Marsha Ramirez LPN       "

## 2023-04-05 NOTE — PROGRESS NOTES
CHIEF COMPLAINT:   Chief Complaint   Patient presents with     Work Comp       HISTORY OF PRESENTING WORK INJURY     Megan reports a 2/10 pain today with improvement.  She still has some difficulty showing me full extension.  Otherwise, she feels improved and more stable.  The brace is now producing more pain and she is hopeful to not have to wear that.  She also brought in a cane today that she says she isn't using, but was afraid of ice conditions outside and brought it for that only.      PAST MEDICAL HISTORY:  Past Medical History:   Diagnosis Date     Nicotine dependence, uncomplicated     No Comments Provided     Presence of dental prosthetic device     No Comments Provided     Uncomplicated alcohol abuse     No Comments Provided     Viral hepatitis C without hepatic coma     5/11/2016       PAST SURGICAL HISTORY:  Past Surgical History:   Procedure Laterality Date     COLONOSCOPY  05/16/2016 5/16/16,[id number],COLONOSCOPY W/ BIOPSIES,colon polyps-repeat in 3 years     COLONOSCOPY N/A 7/7/2020    2 small tubular adenomas.  Follow up 2025     CONIZATION LEEP      10/28/2010,Had YAHAIRA 1 but wanted something done     LAPAROSCOPIC TUBAL LIGATION      1988     OTHER SURGICAL HISTORY      [id number],OTHER,Full dentures       ALLERGIES:  No Known Allergies    CURRENT MEDICATIONS:  Current Outpatient Medications   Medication Sig Dispense Refill     albuterol (PROAIR HFA/PROVENTIL HFA/VENTOLIN HFA) 108 (90 Base) MCG/ACT inhaler INHALE 2 PUFFS INTO THE LUNGS EVERY 4 HOURS AS NEEDED FOR SHORTNESS OF BREATH OR DIFFICULT BREATHING OR WHEEZING 18 g 11     escitalopram (LEXAPRO) 20 MG tablet Take 1 tablet (20 mg) by mouth daily 90 tablet 1     mometasone-formoterol (DULERA) 100-5 MCG/ACT inhaler Inhale 2 puffs into the lungs 2 times daily 13 g 11     tiotropium (SPIRIVA RESPIMAT) 2.5 MCG/ACT inhaler Inhale 2 puffs into the lungs daily 4 g 11       SOCIAL HISTORY:  Social History     Socioeconomic History     Marital status:  "     Spouse name: Not on file     Number of children: Not on file     Years of education: Not on file     Highest education level: Not on file   Occupational History     Not on file   Tobacco Use     Smoking status: Every Day     Packs/day: 0.75     Years: 40.00     Pack years: 30.00     Types: Cigarettes     Smokeless tobacco: Never   Vaping Use     Vaping status: Never Used   Substance and Sexual Activity     Alcohol use: Yes     Alcohol/week: 32.0 standard drinks of alcohol     Types: 32 Cans of beer per week     Comment: Alcoholic Drinks/day: 4-6 beer a day     Drug use: Not Currently     Types: Methamphetamines     Comment: quit meth for 16 years as of today 6/8/20     Sexual activity: Yes     Partners: Male   Other Topics Concern     Parent/sibling w/ CABG, MI or angioplasty before 65F 55M? Not Asked   Social History Narrative    Moved here from [place] in 2014   but now back living with him again.  Works at [employer] as a manager  Her daughter(Pricila Harmon)lives in the [facility] and son lives in [place].  Has 2 grand children and expecting another.     Social Determinants of Health     Financial Resource Strain: Not on file   Food Insecurity: Not on file   Transportation Needs: Not on file   Physical Activity: Not on file   Stress: Not on file   Social Connections: Not on file   Intimate Partner Violence: Not on file   Housing Stability: Not on file       FAMILY HISTORY:  Family History   Problem Relation Age of Onset     Other - See Comments Father         copd     Breast Cancer Sister 50        Cancer-breast     Diabetes Sister         Diabetes     Diabetes Paternal Grandfather         Diabetes       REVIEW OF SYSTEMS:    Unchanged      PHYSICAL EXAM:   /76 (BP Location: Right arm, Patient Position: Sitting, Cuff Size: Adult Regular)   Pulse 95   Temp 97.5  F (36.4  C) (Tympanic)   Resp 20   Ht 1.549 m (5' 1\")   Wt 43.7 kg (96 lb 4 oz)   SpO2 94%   BMI 18.19 kg/m   Body " mass index is 18.19 kg/m . General Appearance: Pleasant, alert, appropriate appearance for age. No acute distress. Patient is ambulatory without assistance and is able to move freely about the examination room.  She does not use the cane.    Palpation:    Effusion: neg   Pain Palpated: neg  Skin:   Neg, intact, skin is dry  Range of Motion Testing:   Flexion: Full degrees (0-135)   Extension: 5 (0)  Medial Collateral Ligament Injury Tests:   Abduction (Valgus) Stress Test: negative   Apley's Distraction Test: negative  Lateral Collateral Ligament Injury Tests:   Adduction (Varus) Stress Test: negative   Apley's Distraction Test: negative  Meniscus:   Bounce Home Test: negative  Medial Meniscus Tests:   Apley's Compression Test: negative   Raina Sign: negative (medial)  Lateral Meniscus Tests:   Apley's Knee Compression Test: negative   Raina Sign: negative (lateral)  Patella:   Apprehension Test for the Patella: negative   Yo's Sign: negative (Patellar Chondromalacia)   Fouchet's Sign: negative (Patellofemoral     Dysfunction/Tracking)    Patella Ballottement Test: negative  Anterior Cruciate Ligament:   Anterior Drawer Test: negative   Lachman Test: negative  Posterior Cruciate Ligament:   Posterior Drawer Test: negative  Iliotibial Band:   Noble Compression Test: negative  Anterolateral/Anteromedial Rotatory Instability:   Lili's Test: negative        IMPRESSION/PLAN:    Megan is still having some difficulty with full extension and some pain with full weight bearing.  Her ortho exam is essentially negative.  I will issue her a few more days of rest and then she may return to work on Tuesday.  She was instructed, and understood fully, to contact me with any aggravating work duties, or if she does not believe she can perform her full work duties.  This was indicated on the workability form and signed by her.  Two copies issued, one for her and one for her employer.  Hold chart open for additional 3  weeks. She had no additional questions.      Total time spent today in chart review/preparation: 5 minutes  Total time spent today in face to face evaluation: 17 minutes  Total time spent today in documentation: 8 minutes.        Sachin Lange DC, WARD, CICE  Director - Occupational Medicine Department  Diplomate of the American Board of Forensic Professionals  Board Certified - American Board of Independent Medical Examiners  [place] [id number]  Phone [phone]  Fax [phone]    2:20 PM 4/5/2023

## 2023-09-16 ENCOUNTER — HEALTH MAINTENANCE LETTER (OUTPATIENT)
Age: 58
End: 2023-09-16

## 2023-11-08 ENCOUNTER — OFFICE VISIT (OUTPATIENT)
Dept: FAMILY MEDICINE | Facility: OTHER | Age: 58
End: 2023-11-08
Attending: FAMILY MEDICINE
Payer: COMMERCIAL

## 2023-11-08 VITALS
HEART RATE: 93 BPM | WEIGHT: 99.8 LBS | TEMPERATURE: 98.2 F | OXYGEN SATURATION: 94 % | SYSTOLIC BLOOD PRESSURE: 110 MMHG | DIASTOLIC BLOOD PRESSURE: 78 MMHG | RESPIRATION RATE: 16 BRPM | BODY MASS INDEX: 18.86 KG/M2

## 2023-11-08 DIAGNOSIS — K08.89 ILL-FITTING DENTURES: ICD-10-CM

## 2023-11-08 DIAGNOSIS — Z87.19 HISTORY OF PERIODONTAL DISEASE: ICD-10-CM

## 2023-11-08 DIAGNOSIS — K05.219 ACUTE PERIODONTAL ABSCESS: Primary | ICD-10-CM

## 2023-11-08 DIAGNOSIS — Z97.2 ILL-FITTING DENTURES: ICD-10-CM

## 2023-11-08 PROCEDURE — 99213 OFFICE O/P EST LOW 20 MIN: CPT | Performed by: NURSE PRACTITIONER

## 2023-11-08 PROCEDURE — G0463 HOSPITAL OUTPT CLINIC VISIT: HCPCS

## 2023-11-08 RX ORDER — CHLORHEXIDINE GLUCONATE ORAL RINSE 1.2 MG/ML
15 SOLUTION DENTAL 2 TIMES DAILY
Qty: 473 ML | Refills: 1 | Status: SHIPPED | OUTPATIENT
Start: 2023-11-08

## 2023-11-08 ASSESSMENT — PAIN SCALES - GENERAL: PAINLEVEL: SEVERE PAIN (6)

## 2023-11-08 NOTE — NURSING NOTE
Patient presents to clinic for concern of abscess tooth  /78   Pulse 93   Temp 98.2  F (36.8  C) (Temporal)   Resp 16   Wt 45.3 kg (99 lb 12.8 oz)   SpO2 94%   BMI 18.86 kg/m    Sylvia Menon LPN on 11/8/2023 at 12:57 PM

## 2023-11-08 NOTE — LETTER
November 8, 2023      Megan Cash  [address] [place] [id number]        To Whom It May Concern:    Megan Cash  was seen on 11/8/23.  Please excuse her until 11/10/23 due to illness.        Sincerely,        BANDAR Harris CNP

## 2023-11-08 NOTE — PROGRESS NOTES
"Assessment & Plan     ICD-10-CM    1. Acute periodontal abscess  K05.219 amoxicillin-clavulanate (AUGMENTIN) 875-125 MG tablet     chlorhexidine (PERIDEX) 0.12 % solution      2. History of periodontal disease  Z87.19       3. Ill-fitting dentures  K08.89     Z97.2         Vital signs stable. Physical exam consistent with acute periodontal abscess. Script sent for Augmentin 875-125 tablets, take 1 tablet PO BID x 7 days. Script also sent for Peridex solution, swish 15 mL PO BID until follow-up with dentist.  Apply a cold pack or ice compress for up to 20 minutes several times a day. This is to help reduce pain and relieve swelling. Cover it with a thin, dry cloth before putting it on your skin.    Rinse your mouth with warm saltwater several times per day. This will help reduce irritation, gum swelling, and pain.  Good oral hygiene is imperitive. Brush your at least twice a day. Use a fluoride toothpaste and soft-bristle toothbrush.  Eat a healthy diet that doesn t include many sugary foods and drinks.    Call [facility] to schedule an appointment to see a dentist. Local dental office resource sheet given to patient prior to leaving .    Return to ED/UC if symptoms increase or concerns develop such as those discussed and listed on the \"When to go the Emergency Room\" portion of your discharge instructions.      Return if symptoms worsen or fail to improve.    BANDAR Harris [facility]        Radha Guzman is a 58 year old, presenting for the following health issues:  Dental Injury      Patient presents with concerns of abscessed tooth x 4 days. Has been using peroxide and gargling with salt water.   Does not have a dentist in the area. Has had dentures x 20 years. Has 3 original lower teeth. Right lower tooth visible fillings, black areas of tooth. Surrounding erythema. Pain 5-6/10.  Has not been able to find dentist for over 10 years that will work with her insurance.        "     Review of Systems   Constitutional:  Negative for chills and fever.   HENT:  Positive for dental problem. Negative for facial swelling, mouth sores and trouble swallowing.    Respiratory:  Negative for chest tightness and shortness of breath.    All other systems reviewed and are negative.           Objective    /78   Pulse 93   Temp 98.2  F (36.8  C) (Temporal)   Resp 16   Wt 45.3 kg (99 lb 12.8 oz)   SpO2 94%   BMI 18.86 kg/m    Body mass index is 18.86 kg/m .  Physical Exam  Constitutional:       Appearance: Normal appearance.   HENT:      Mouth/Throat:      Lips: Pink.      Dentition: Abnormal dentition. Has dentures (ill-fitting). Dental tenderness, dental caries and dental abscesses present.      Tongue: No lesions.      Pharynx: Oropharynx is clear. No pharyngeal swelling or posterior oropharyngeal erythema.   Neurological:      Mental Status: She is alert.

## 2023-11-13 ENCOUNTER — OFFICE VISIT (OUTPATIENT)
Dept: FAMILY MEDICINE | Facility: OTHER | Age: 58
End: 2023-11-13
Attending: PHYSICIAN ASSISTANT
Payer: COMMERCIAL

## 2023-11-13 VITALS
BODY MASS INDEX: 18.12 KG/M2 | RESPIRATION RATE: 20 BRPM | HEART RATE: 92 BPM | OXYGEN SATURATION: 95 % | TEMPERATURE: 97.7 F | WEIGHT: 96 LBS | SYSTOLIC BLOOD PRESSURE: 116 MMHG | HEIGHT: 61 IN | DIASTOLIC BLOOD PRESSURE: 60 MMHG

## 2023-11-13 DIAGNOSIS — Z86.19 HISTORY OF HEPATITIS C: ICD-10-CM

## 2023-11-13 DIAGNOSIS — Z11.4 SCREENING FOR HIV (HUMAN IMMUNODEFICIENCY VIRUS): ICD-10-CM

## 2023-11-13 DIAGNOSIS — Z01.419 PAP TEST, AS PART OF ROUTINE GYNECOLOGICAL EXAMINATION: ICD-10-CM

## 2023-11-13 DIAGNOSIS — Z13.220 SCREENING CHOLESTEROL LEVEL: ICD-10-CM

## 2023-11-13 DIAGNOSIS — Z13.820 SCREENING FOR OSTEOPOROSIS: ICD-10-CM

## 2023-11-13 DIAGNOSIS — Z87.891 PERSONAL HISTORY OF TOBACCO USE: ICD-10-CM

## 2023-11-13 DIAGNOSIS — Z23 NEED FOR HEPATITIS A AND B VACCINATION: ICD-10-CM

## 2023-11-13 DIAGNOSIS — F41.1 GAD (GENERALIZED ANXIETY DISORDER): ICD-10-CM

## 2023-11-13 DIAGNOSIS — K13.70 MOUTH LESION: ICD-10-CM

## 2023-11-13 DIAGNOSIS — Z00.00 ROUTINE GENERAL MEDICAL EXAMINATION AT A HEALTH CARE FACILITY: Primary | ICD-10-CM

## 2023-11-13 DIAGNOSIS — Z12.31 ENCOUNTER FOR SCREENING MAMMOGRAM FOR MALIGNANT NEOPLASM OF BREAST: ICD-10-CM

## 2023-11-13 DIAGNOSIS — Z23 NEED FOR VACCINATION FOR PNEUMOCOCCUS: ICD-10-CM

## 2023-11-13 DIAGNOSIS — R63.0 DECREASED APPETITE: ICD-10-CM

## 2023-11-13 DIAGNOSIS — J44.9 CHRONIC OBSTRUCTIVE PULMONARY DISEASE, UNSPECIFIED COPD TYPE (H): ICD-10-CM

## 2023-11-13 DIAGNOSIS — Z11.59 ENCOUNTER FOR HEPATITIS C SCREENING TEST FOR LOW RISK PATIENT: ICD-10-CM

## 2023-11-13 DIAGNOSIS — F10.20 ALCOHOLIC (H): ICD-10-CM

## 2023-11-13 LAB
ALBUMIN SERPL BCG-MCNC: 4.4 G/DL (ref 3.5–5.2)
ALP SERPL-CCNC: 66 U/L (ref 35–104)
ALT SERPL W P-5'-P-CCNC: 12 U/L (ref 0–50)
ANION GAP SERPL CALCULATED.3IONS-SCNC: 13 MMOL/L (ref 7–15)
AST SERPL W P-5'-P-CCNC: 25 U/L (ref 0–45)
BASOPHILS # BLD AUTO: 0.1 10E3/UL (ref 0–0.2)
BASOPHILS NFR BLD AUTO: 1 %
BILIRUB SERPL-MCNC: 0.2 MG/DL
BUN SERPL-MCNC: 15 MG/DL (ref 8–23)
CALCIUM SERPL-MCNC: 9.7 MG/DL (ref 8.6–10)
CHLORIDE SERPL-SCNC: 98 MMOL/L (ref 98–107)
CHOLEST SERPL-MCNC: 199 MG/DL
CREAT SERPL-MCNC: 0.74 MG/DL (ref 0.51–0.95)
DEPRECATED HCO3 PLAS-SCNC: 27 MMOL/L (ref 22–29)
EGFRCR SERPLBLD CKD-EPI 2021: >90 ML/MIN/1.73M2
EOSINOPHIL # BLD AUTO: 0.3 10E3/UL (ref 0–0.7)
EOSINOPHIL NFR BLD AUTO: 3 %
ERYTHROCYTE [DISTWIDTH] IN BLOOD BY AUTOMATED COUNT: 12 % (ref 10–15)
GLUCOSE SERPL-MCNC: 77 MG/DL (ref 70–99)
HCT VFR BLD AUTO: 48.6 % (ref 35–47)
HDLC SERPL-MCNC: 85 MG/DL
HGB BLD-MCNC: 17 G/DL (ref 11.7–15.7)
IMM GRANULOCYTES # BLD: 0 10E3/UL
IMM GRANULOCYTES NFR BLD: 0 %
LDLC SERPL CALC-MCNC: 82 MG/DL
LYMPHOCYTES # BLD AUTO: 2.8 10E3/UL (ref 0.8–5.3)
LYMPHOCYTES NFR BLD AUTO: 30 %
MCH RBC QN AUTO: 34 PG (ref 26.5–33)
MCHC RBC AUTO-ENTMCNC: 35 G/DL (ref 31.5–36.5)
MCV RBC AUTO: 97 FL (ref 78–100)
MONOCYTES # BLD AUTO: 0.8 10E3/UL (ref 0–1.3)
MONOCYTES NFR BLD AUTO: 8 %
NEUTROPHILS # BLD AUTO: 5.3 10E3/UL (ref 1.6–8.3)
NEUTROPHILS NFR BLD AUTO: 58 %
NONHDLC SERPL-MCNC: 114 MG/DL
NRBC # BLD AUTO: 0 10E3/UL
NRBC BLD AUTO-RTO: 0 /100
PLATELET # BLD AUTO: 239 10E3/UL (ref 150–450)
POTASSIUM SERPL-SCNC: 3.9 MMOL/L (ref 3.4–5.3)
PROT SERPL-MCNC: 7.5 G/DL (ref 6.4–8.3)
RBC # BLD AUTO: 5 10E6/UL (ref 3.8–5.2)
SODIUM SERPL-SCNC: 138 MMOL/L (ref 135–145)
TRIGL SERPL-MCNC: 161 MG/DL
WBC # BLD AUTO: 9.2 10E3/UL (ref 4–11)

## 2023-11-13 PROCEDURE — 90677 PCV20 VACCINE IM: CPT

## 2023-11-13 PROCEDURE — 87389 HIV-1 AG W/HIV-1&-2 AB AG IA: CPT | Mod: ZL | Performed by: PHYSICIAN ASSISTANT

## 2023-11-13 PROCEDURE — 80061 LIPID PANEL: CPT | Mod: ZL | Performed by: PHYSICIAN ASSISTANT

## 2023-11-13 PROCEDURE — 85025 COMPLETE CBC W/AUTO DIFF WBC: CPT | Mod: ZL | Performed by: PHYSICIAN ASSISTANT

## 2023-11-13 PROCEDURE — 80053 COMPREHEN METABOLIC PANEL: CPT | Mod: ZL | Performed by: PHYSICIAN ASSISTANT

## 2023-11-13 PROCEDURE — 99406 BEHAV CHNG SMOKING 3-10 MIN: CPT | Performed by: PHYSICIAN ASSISTANT

## 2023-11-13 PROCEDURE — 87624 HPV HI-RISK TYP POOLED RSLT: CPT | Mod: ZL | Performed by: PHYSICIAN ASSISTANT

## 2023-11-13 PROCEDURE — G0123 SCREEN CERV/VAG THIN LAYER: HCPCS | Performed by: PHYSICIAN ASSISTANT

## 2023-11-13 PROCEDURE — 36415 COLL VENOUS BLD VENIPUNCTURE: CPT | Mod: ZL | Performed by: PHYSICIAN ASSISTANT

## 2023-11-13 PROCEDURE — 86803 HEPATITIS C AB TEST: CPT | Mod: ZL | Performed by: PHYSICIAN ASSISTANT

## 2023-11-13 PROCEDURE — G0296 VISIT TO DETERM LDCT ELIG: HCPCS | Performed by: PHYSICIAN ASSISTANT

## 2023-11-13 PROCEDURE — 87522 HEPATITIS C REVRS TRNSCRPJ: CPT | Mod: ZL | Performed by: PHYSICIAN ASSISTANT

## 2023-11-13 PROCEDURE — 90472 IMMUNIZATION ADMIN EACH ADD: CPT

## 2023-11-13 PROCEDURE — 99396 PREV VISIT EST AGE 40-64: CPT | Mod: 25 | Performed by: PHYSICIAN ASSISTANT

## 2023-11-13 RX ORDER — ALBUTEROL SULFATE 90 UG/1
AEROSOL, METERED RESPIRATORY (INHALATION)
Qty: 18 G | Refills: 11 | Status: SHIPPED | OUTPATIENT
Start: 2023-11-13

## 2023-11-13 RX ORDER — ESCITALOPRAM OXALATE 20 MG/1
20 TABLET ORAL DAILY
Qty: 90 TABLET | Refills: 3 | Status: SHIPPED | OUTPATIENT
Start: 2023-11-13

## 2023-11-13 ASSESSMENT — ENCOUNTER SYMPTOMS
CHILLS: 1
SHORTNESS OF BREATH: 1
NAUSEA: 1
NERVOUS/ANXIOUS: 1
BREAST MASS: 0
DIZZINESS: 1

## 2023-11-13 ASSESSMENT — PAIN SCALES - GENERAL: PAINLEVEL: NO PAIN (0)

## 2023-11-13 ASSESSMENT — PATIENT HEALTH QUESTIONNAIRE - PHQ9
10. IF YOU CHECKED OFF ANY PROBLEMS, HOW DIFFICULT HAVE THESE PROBLEMS MADE IT FOR YOU TO DO YOUR WORK, TAKE CARE OF THINGS AT HOME, OR GET ALONG WITH OTHER PEOPLE: SOMEWHAT DIFFICULT
SUM OF ALL RESPONSES TO PHQ QUESTIONS 1-9: 11
SUM OF ALL RESPONSES TO PHQ QUESTIONS 1-9: 11

## 2023-11-13 NOTE — PROGRESS NOTES
SUBJECTIVE:   CC: Megan is an 58 year old who presents for preventive health visit.       Healthy Habits:     Getting at least 3 servings of Calcium per day:  Yes    Bi-annual eye exam:  NO    Dental care twice a year:  NO    Sleep apnea or symptoms of sleep apnea:  Daytime drowsiness, Excessive snoring and Sleep apnea    Diet:  Breakfast skipped and Other    Frequency of exercise:  None    Taking medications regularly:  Yes    Medication side effects:  Significant flushing and Lightheadedness    Additional concerns today:  Yes  Answers submitted by the patient for this visit:  Patient Health Questionnaire (Submitted on 2023)  If you checked off any problems, how difficult have these problems made it for you to do your work, take care of things at home, or get along with other people?: Somewhat difficult  PHQ9 TOTAL SCORE: 11      Today's PHQ-9 Score:       2023     3:27 PM   PHQ-9 SCORE   PHQ-9 Total Score MyChart 11 (Moderate depression)   PHQ-9 Total Score 11       No LMP recorded. Patient is postmenopausal.   Contraception: Postmenopausal  Risk for STI?: none  Last pap: 2020 - normal pap and HPV, repeated  Any hx of abnormal paps:  LEEP - 10 years   FH of early CA?: none  Cholesterol/DM concerns/screenin, repeated  Tobacco?: yes  Lung cancer screening: yes  Calcium intake: yes  DEXA: none, ordered  Last mammo: 2021, ordered repeat  Colonoscopy: 2020 -colonoscopy, 2 small tubular adenomas, repeat in 5 years  Hepatitis C screen: 2021 - negative, repeated    HIV screen: 2021 - negative, repeated  Immunizations: prevnar 20 and twinrix     Patient struggles with no appetite.  History of alcoholism.  Currently taking Ensure Plus 1-2 daily in order to improve nutrition.  Has no desire to eat.  No taste.  No occasional vomiting and diarrhea.  Last vomited a few weeks ago.  Had diarrhea last week with Augmentin.  Ensure has been helping to stabilize her weight.  Wondering  about getting a prescription sent to the pharmacy.    Patient has noticed a mouth lesion on the right buccal mucosa.  Getting larger.    Social History     Tobacco Use     Smoking status: Every Day     Packs/day: 0.75     Years: 40.00     Additional pack years: 0.00     Total pack years: 30.00     Types: Cigarettes     Smokeless tobacco: Never   Substance Use Topics     Alcohol use: Yes     Alcohol/week: 32.0 standard drinks of alcohol     Types: 32 Cans of beer per week     Comment: Alcoholic Drinks/day: 4-6 beer a day             11/13/2023     3:24 PM   Alcohol Use   Prescreen: >3 drinks/day or >7 drinks/week? No     Reviewed orders with patient.  Reviewed health maintenance and updated orders accordingly - Yes  Labs reviewed in EPIC  BP Readings from Last 3 Encounters:   11/13/23 116/60   11/08/23 110/78   04/05/23 126/76    Wt Readings from Last 3 Encounters:   11/13/23 43.5 kg (96 lb)   11/08/23 45.3 kg (99 lb 12.8 oz)   04/05/23 43.7 kg (96 lb 4 oz)                  Patient Active Problem List   Diagnosis     Adenomatous colon polyp     Alcohol abuse, daily use     Alcoholic (H)     Full dentures     PCR positive for hepatitis C virus     Smoker     Hepatitis C, chronic (H)     Chronic obstructive pulmonary disease, unspecified COPD type (H)     LISA (generalized anxiety disorder)     Personal history of tobacco use     Decreased appetite     History of hepatitis C     Past Surgical History:   Procedure Laterality Date     COLONOSCOPY  05/16/2016 5/16/16,[id number],COLONOSCOPY W/ BIOPSIES,colon polyps-repeat in 3 years     COLONOSCOPY N/A 7/7/2020    2 small tubular adenomas.  Follow up 2025     CONIZATION LEEP      10/28/2010,Had YAHAIRA 1 but wanted something done     LAPAROSCOPIC TUBAL LIGATION      1988     OTHER SURGICAL HISTORY      [id number],OTHER,Full dentures       Social History     Tobacco Use     Smoking status: Every Day     Packs/day: 0.75     Years: 40.00     Additional pack years: 0.00     Total pack  years: 30.00     Types: Cigarettes     Smokeless tobacco: Never   Substance Use Topics     Alcohol use: Yes     Alcohol/week: 32.0 standard drinks of alcohol     Types: 32 Cans of beer per week     Comment: Alcoholic Drinks/day: 4-6 beer a day     Family History   Problem Relation Age of Onset     Other - See Comments Father         copd     Breast Cancer Sister 50        Cancer-breast     Diabetes Sister      Arthritis Sister      Diabetes Paternal Grandfather         Diabetes         Current Outpatient Medications   Medication Sig Dispense Refill     albuterol (PROAIR HFA/PROVENTIL HFA/VENTOLIN HFA) 108 (90 Base) MCG/ACT inhaler INHALE 2 PUFFS INTO THE LUNGS EVERY 4 HOURS AS NEEDED FOR SHORTNESS OF BREATH OR DIFFICULT BREATHING OR WHEEZING 18 g 11     amoxicillin-clavulanate (AUGMENTIN) 875-125 MG tablet Take 1 tablet by mouth 2 times daily for 7 days 14 tablet 0     chlorhexidine (PERIDEX) 0.12 % solution Swish and spit 15 mLs in mouth 2 times daily Until follow-up with dentist. 473 mL 1     escitalopram (LEXAPRO) 20 MG tablet Take 1 tablet (20 mg) by mouth daily 90 tablet 3     mometasone-formoterol (DULERA) 100-5 MCG/ACT inhaler Inhale 2 puffs into the lungs 2 times daily 39 g 4     nicotine (NICORETTE) 2 MG gum Place 1 each (2 mg) inside cheek every hour as needed for smoking cessation 200 each 11     Nutritional Supplements (ENSURE PLUS HIGH PROTEIN) LIQD Take 1 Can by mouth daily 7110 mL 11     tiotropium (SPIRIVA RESPIMAT) 2.5 MCG/ACT inhaler Inhale 2 puffs into the lungs daily 12 g 11     No Known Allergies  Recent Labs   Lab Test 11/13/23  1635 06/25/21  1557 09/22/20  1306 07/07/20  1014 06/08/20  1617 05/02/16  1430 05/02/16  1430   LDL 82  --   --   --  110*  --  142*   HDL 85  --   --   --  89  --  85   TRIG 161*  --   --   --  61  --  71   ALT 12 11 11 35 63*   < >  --    CR 0.74  --   --  0.66 0.82  --  0.81   GFRESTIMATED >90  --   --  >90 Not Calculated   < >  --    GFRESTBLACK  --   --   --  >90  Not Calculated  --  >60   POTASSIUM 3.9  --   --  4.6 4.0  --  4.2    < > = values in this interval not displayed.        Breast Cancer Screening:  Any new diagnosis of family breast, ovarian, or bowel cancer? No    FHS-7:       8/26/2021     3:49 PM 11/13/2023     3:36 PM 11/15/2023     8:48 AM   Breast CA Risk Assessment (FHS-7)   Did any of your first-degree relatives have breast or ovarian cancer? Yes Yes No   Did any of your relatives have bilateral breast cancer? No Yes No   Did any man in your family have breast cancer? No No No   Did any woman in your family have breast and ovarian cancer? No Yes No   Did any woman in your family have breast cancer before age 50 y? No  No   Do you have 2 or more relatives with breast and/or ovarian cancer? No Yes No   Do you have 2 or more relatives with breast and/or bowel cancer? No Yes No       Mammogram Screening: Recommended mammography every 1-2 years with patient discussion and risk factor consideration  Pertinent mammograms are reviewed under the imaging tab.    History of abnormal Pap smear: Last 3 Pap and HPV Results:       Latest Ref Rng & Units 11/6/2020     2:10 PM   PAP / HPV   PAP (Historical)  NIL    HPV 16 DNA NEG^Negative Negative    HPV 18 DNA NEG^Negative Negative    Other HR HPV NEG^Negative Negative          Latest Ref Rng & Units 11/6/2020     2:10 PM   PAP / HPV   PAP (Historical)  NIL    HPV 16 DNA NEG^Negative Negative    HPV 18 DNA NEG^Negative Negative    Other HR HPV NEG^Negative Negative      Reviewed and updated as needed this visit by clinical staff   Tobacco  Allergies  Meds  Problems  Med Hx  Surg Hx  Fam Hx          Reviewed and updated as needed this visit by Provider   Tobacco  Allergies  Meds  Problems  Med Hx  Surg Hx  Fam Hx         Past Medical History:   Diagnosis Date     Nicotine dependence, uncomplicated     No Comments Provided     Presence of dental prosthetic device     No Comments Provided     Uncomplicated  "alcohol abuse     No Comments Provided     Viral hepatitis C without hepatic coma     2016      Past Surgical History:   Procedure Laterality Date     COLONOSCOPY  2016,[id number],COLONOSCOPY W/ BIOPSIES,colon polyps-repeat in 3 years     COLONOSCOPY N/A 2020    2 small tubular adenomas.  Follow up      CONIZATION LEEP      10/28/2010,Had YAHAIRA 1 but wanted something done     LAPAROSCOPIC TUBAL LIGATION           OTHER SURGICAL HISTORY      [id number],OTHER,Full dentures     OB History    Para Term  AB Living   2 2 2 0 0 4   SAB IAB Ectopic Multiple Live Births   0 0 0 0 0       Review of Systems   Constitutional:  Positive for chills.   Respiratory:  Positive for shortness of breath.    Gastrointestinal:  Positive for nausea.   Breasts:  Negative for tenderness, breast mass and discharge.   Genitourinary:  Positive for pelvic pain. Negative for vaginal bleeding and vaginal discharge.   Neurological:  Positive for dizziness.   Psychiatric/Behavioral:  The patient is nervous/anxious.           OBJECTIVE:   /60   Pulse 92   Temp 97.7  F (36.5  C) (Tympanic)   Resp 20   Ht 1.549 m (5' 1\")   Wt 43.5 kg (96 lb)   SpO2 95%   BMI 18.14 kg/m    Physical Exam  GENERAL APPEARANCE: healthy, alert and no distress  EYES: Eyes grossly normal to inspection, PERRL and conjunctivae and sclerae normal  HENT: ear canals and TM's normal, nose and mouth without ulcers or lesions, oropharynx clear and oral mucous membranes moist  NECK: no adenopathy, no asymmetry, masses, or scars and thyroid normal to palpation  RESP: lungs clear to auscultation - no rales, rhonchi or wheezes  CV: regular rate and rhythm, normal S1 S2, no S3 or S4, no murmur, click or rub, no peripheral edema and peripheral pulses strong  ABDOMEN: soft, nontender, no hepatosplenomegaly, no masses and bowel sounds normal   (female): normal female external genitalia, normal urethral meatus, vaginal mucosal atrophy " noted, normal cervix, adnexae, and uterus without masses or abnormal discharge  Pap completed: yes  MS: no musculoskeletal defects are noted and gait is age appropriate without ataxia  SKIN: no suspicious lesions or rashes  NEURO: Normal strength and tone, sensory exam grossly normal, mentation intact and speech normal  PSYCH: mentation appears normal and affect normal/bright    Diagnostic Test Results:  Labs reviewed in Epic    ASSESSMENT/PLAN:       ICD-10-CM    1. Routine general medical examination at a health care facility  Z00.00       2. Chronic obstructive pulmonary disease, unspecified COPD type (H)  J44.9 albuterol (PROAIR HFA/PROVENTIL HFA/VENTOLIN HFA) 108 (90 Base) MCG/ACT inhaler     mometasone-formoterol (DULERA) 100-5 MCG/ACT inhaler     tiotropium (SPIRIVA RESPIMAT) 2.5 MCG/ACT inhaler     Nutritional Supplements (ENSURE PLUS HIGH PROTEIN) LIQD      3. LISA (generalized anxiety disorder)  F41.1 escitalopram (LEXAPRO) 20 MG tablet     Nutritional Supplements (ENSURE PLUS HIGH PROTEIN) LIQD      4. Personal history of tobacco use  Z87.891 Prof fee: Shared Decision Making for Lung Cancer Screening     CT Chest Lung Cancer Scrn Low Dose wo     SMOKING CESSATION COUNSELING 3-10 MIN     nicotine (NICORETTE) 2 MG gum      5. Encounter for screening mammogram for malignant neoplasm of breast  Z12.31 MA Screen Bilateral w/Yariel      6. Screening for osteoporosis  Z13.820 DX Hip/Pelvis/Spine      7. Need for vaccination for pneumococcus  Z23       8. Need for hepatitis A and B vaccination  Z23 GH IMM-  HEPA/HEPB VACCINE ADULT IM      9. Pap test, as part of routine gynecological examination  Z01.419 Pap Screen with HPV - recommended age 30 - 65 years     HPV High Risk Types DNA Cervical      10. Screening cholesterol level  Z13.220 Lipid Panel     Lipid Panel      11. Alcoholic (H)  F10.20 CBC and Differential     Comprehensive Metabolic Panel     Nutritional Supplements (ENSURE PLUS HIGH PROTEIN) LIQD     CBC  and Differential     Comprehensive Metabolic Panel      12. Encounter for hepatitis C screening test for low risk patient  Z11.59 Hepatitis C Screen Reflex to HCV RNA Quant and Genotype     Hepatitis C Screen Reflex to HCV RNA Quant and Genotype     Hepatitis C RNA, Quantitative by PCR with Confirmatory Reflex to Genotyping      13. Screening for HIV (human immunodeficiency virus)  Z11.4 HIV Antigen Antibody Combo     HIV Antigen Antibody Combo      14. Decreased appetite  R63.0 Nutritional Supplements (ENSURE PLUS HIGH PROTEIN) LIQD      15. History of hepatitis C  Z86.19 Nutritional Supplements (ENSURE PLUS HIGH PROTEIN) LIQD      16. Mouth lesion  K13.70 Adult ENT  Referral        Mouth lesion: Refer to ENT for consult.    History of hepatitis C, alcoholism, and COPD: Discussed nutrition concerns at length.  Highly stressed need to increase dietary intake of food items containing high-protein, iron, and good nutrition.  Sent a prescription for Ensure Plus to the pharmacy for her to take 1 daily.    Completed HIV and hepatitis C screen.    Alcoholism: Highly stressed need to decrease her alcohol intake and stop drinking alcohol.  Encouraged to go to AA as needed.  Encouraged to see a therapist.    COPD: Refilled medication.  No acute concerns at this time.  Currently stable.    General anxiety disorder: Refilled Lexapro.  Gave side effect profile.  Encourage good diet and exercise.  See a counselor.    History of tobacco use: Encouraged to quit smoking to help reduce future heart disease complications.  Patient was given nicotine gum to assist with smoking cessation.  Ordered low dose chest CT for lung cancer screening.      Ordered mammogram for breast cancer screening.  Ordered DEXA scan for osteoporosis screening.    Completed Pap and HPV for cervical cancer screening.    Repeat physical in 1 year.    Patient has been advised of split billing requirements and indicates understanding: Yes    Depression  Screening Follow Up        2023     3:27 PM   PHQ   PHQ-9 Total Score 11   Q9: Thoughts of better off dead/self-harm past 2 weeks Not at all         2023     3:27 PM   Last PHQ-9   1.  Little interest or pleasure in doing things 2   2.  Feeling down, depressed, or hopeless 2   3.  Trouble falling or staying asleep, or sleeping too much 2   4.  Feeling tired or having little energy 1   5.  Poor appetite or overeating 3   6.  Feeling bad about yourself 1   7.  Trouble concentrating 0   8.  Moving slowly or restless 0   Q9: Thoughts of better off dead/self-harm past 2 weeks 0   PHQ-9 Total Score 11       Follow Up Actions Taken  Crisis resource information provided in After Visit Summary  Referred patient back to current mental health provider.       COUNSELING:  Reviewed preventive health counseling, as reflected in patient instructions       Regular exercise       Healthy diet/nutrition       Vision screening       Hearing screening       Alcohol Use       Osteoporosis prevention/bone health       Colorectal Cancer Screening       Consider Hep C screening for all patients one time for ages 18-79 years       HIV screeninx in teen years, 1x in adult years, and at intervals if high risk        She reports that she has been smoking cigarettes. She has a 30.00 pack-year smoking history. She has never used smokeless tobacco.  Nicotine/Tobacco Cessation Plan:   Pharmacotherapies : Nicotine gum          Kathy Sung PA-C  [facility] [place] Cancer Screening Shared Decision Making Visit     Megan Cash, a 58 year old female, is eligible for lung cancer screening    History   Smoking Status     Every Day     Packs/day: 0.75     Years: 40.00     Types: Cigarettes   Smokeless Tobacco     Never       I have discussed with patient the risks and benefits of screening for lung cancer with low-dose CT.     The risks include:    radiation exposure: one low dose chest CT has as much ionizing  radiation as about 15 chest x-rays, or 6 months of background radiation living in [place]      false positives: most findings/nodules are NOT cancer, but some might still require additional diagnostic evaluation, including biopsy    over-diagnosis: some slow growing cancers that might never have been clinically significant will be detected and treated unnecessarily     The benefit of early detection of lung cancer is contingent upon adherence to annual screening or more frequent follow up if indicated.     Furthermore, to benefit from screening, Megan must be willing and able to undergo diagnostic procedures, if indicated. Although no specific guide is available for determining severity of comorbidities, it is reasonable to withhold screening in patients who have greater mortality risk from other diseases.     We did discuss that the best way to prevent lung cancer is to not smoke.    Some patients may value a numeric estimation of lung cancer risk when evaluating if lung cancer screening is right for them, here is one calculator:    ShouldIScreen

## 2023-11-13 NOTE — NURSING NOTE
"Chief Complaint   Patient presents with    Physical     Establish care        Initial /60   Pulse 92   Temp 97.7  F (36.5  C) (Tympanic)   Resp 20   Ht 1.549 m (5' 1\")   Wt 43.5 kg (96 lb)   SpO2 95%   BMI 18.14 kg/m   Estimated body mass index is 18.14 kg/m  as calculated from the following:    Height as of this encounter: 1.549 m (5' 1\").    Weight as of this encounter: 43.5 kg (96 lb).  Medication Review: complete    The next two questions are to help us understand your food security.  If you are feeling you need any assistance in this area, we have resources available to support you today.           No data to display                  Health Care Directive:  Patient does not have a Health Care Directive or Living Will: Discussed advance care planning with patient; however, patient declined at this time.    Zelda Toledo, CRYSTAL      "

## 2023-11-13 NOTE — PATIENT INSTRUCTIONS
"Healthy Strategies  Eat at least 3 meals a day and never skip breakfast.  Eat more slowly.  Decrease portion size.  Provide structure by using meal replacement bars or shakes, and/or low calorie frozen meals.  For good nutrition incorporate fruit, vegetables, whole grains, lean protein, and low-fat dairy.  Remove trigger foods from yourenvironment to avoid impulse eating.  Increase physical activity: get a pedometer and aim for 10,000 steps a day or 30-35 minutes of activity 5 days per week.  Weigh yourself daily or at least weekly.  Keep a record of what you eat and your activity.  Establish a support system such as afriend, group or program.    11. Read Zane Suarez's \"Eat to Live\". Remember it is important to have a minimum of 1200 calories a day, okay to use olive oil, 40 grams of fiber daily. No more than two servings (the size of your palm) of red meat a week.     Please consider the following general health recommendations:    Eat a quality diet (generally, low in simple sugars, starches, cholesterol and saturated fat.)    Please get 1200 mg of calcium in divided doses with 800 units vitamin D in your diet daily. Take supplements as needed to obtain full recommended amounts.     Stay physically active. Regular walking or other exercise is one of the best ways to minimize pain of arthritis; maintain independence and mobility; maintain bone strength; maintain conditioning of your heart. Find something you enjoy and a friend to do it with you.    Maintain ideal weight. Your Body mass index is Body mass index is 18.14 kg/m .. Generally a BMI of 20-25 is considered ideal. Overweight is defined as 25-30, obese is 30-35 and markedly obese is greater than 35.    Apply sun block (SPF 25 or greater) on exposed skin anytime you are out in the sun to prevent skin cancer.     Wear a seatbelt whenever you are in a car.    Obtain a flu shot every fall.    You should have a tetanus booster at least once every 10 " years.    Schedule a mammogram annually starting at the age of 40 years old unless recommended earlier by your primary care provider. Come for a general exam once yearly.    Colonoscopy (an exam of the colon) is recommended every 10 years after the age of 45 to screen for colon cancer. (More often if you are at increased risk.)    A vaccine to reduce your chances of getting shingles is available if you are over 50. Information about the vaccine is available through the clinic or at:  (https://www.cdc.gov/vaccines/vpd/shingles/public/shingrix/index.html)    Consider a bone density study every 2-5 years to see if you are at increased risk for fracture. If you have osteoporosis, medicine to strengthen your bones can significantly reduce your risk of fracture, back pain, and loss of height.    Receive a pneumonia shot series after the age of 65 (repeat in 5 years if you have other risk factors). This does not prevent all types of pneumonia, but reduces the risk of the worst bacterial causes of pneumonia.    Check blood sugar annually. Cholesterol annually unless you have had a normal level when last checked within 5 years.     I recommend that you have a general physical exam every year. You should have a pap test every 3 years between the ages of 21 and 30 and every 3-5 years between the ages of 30 and 65 depending on your test unless you have had previous abnormal pap smears, (in these cases the exams and PAP's should be done on a schedule as recommended by your primary care provider). If you have had hysterectomy in the past, your future Pap plan may be different.      Preventive Health Recommendations  Female Ages 50 - 64    Yearly exam: See your health care provider every year in order to  Review health changes.   Discuss preventive care.    Review your medicines if your doctor has prescribed any.    Get a Pap test every three years (unless you have an abnormal result and your provider advises testing more  often).  If you get Pap tests with HPV test, you only need to test every 5 years, unless you have an abnormal result.   You do not need a Pap test if your uterus was removed (hysterectomy) and you have not had cancer.  You should be tested each year for STDs (sexually transmitted diseases) if you're at risk.   Have a mammogram every 1 to 2 years.  Have a colonoscopy at age 45, or have a yearly FIT test (stool test). These exams screen for colon cancer.    Have a cholesterol test every 5 years, or more often if advised.  Have a diabetes test (fasting glucose) every three years. If you are at risk for diabetes, you should have this test more often.   If you are at risk for osteoporosis (brittle bone disease), think about having a bone density scan (DEXA).    Shots: Get a flu shot each year. Get a tetanus shot every 10 years.    Nutrition:   Eat at least 5 servings of fruits and vegetables each day.  Eat whole-grain bread, whole-wheat pasta and brown rice instead of white grains and rice.  Get adequate Calcium and Vitamin D.     Lifestyle  Exercise at least 150 minutes a week (30 minutes a day, 5 days a week). This will help you control your weight and prevent disease.  Limit alcohol to one drink per day.  No smoking.   Wear sunscreen to prevent skin cancer.   See your dentist every six months for an exam and cleaning.  See your eye doctor every 1 to 2 years.    Lung Cancer Screening   Frequently Asked Questions  If you are at high-risk for lung cancer, getting screened with low-dose computed tomography (LDCT) every year can help save your life. This handout offers answers to some of the most common questions about lung cancer screening. If you have other questions, please call [phone] ([phone]).     What is it?  Lung cancer screening uses special X-ray technology to create an image of your lung tissue. The exam is quick and easy and takes less than 10 seconds. We don t give you any medicine or use any  needles. You can eat before and after the exam. You don t need to change your clothes as long as the clothing on your chest doesn t contain metal. But, you do need to be able to hold your breath for at least 6 seconds during the exam.    What is the goal of lung cancer screening?  The goal of lung cancer screening is to save lives. Many times, lung cancer is not found until a person starts having physical symptoms. Lung cancer screening can help detect lung cancer in the earliest stages when it may be easier to treat.    Who should be screened for lung cancer?  We suggest lung cancer screening for anyone who is at high-risk for lung cancer. You are in the high-risk group if you:     are between the ages of 55 and 79, and   have smoked at least 1 pack of cigarettes a day for 20 or more years, and   still smoke or have quit within the past 15 years.    However, if you have a new cough or shortness of breath, you should talk to your doctor before being screened.    Why does it matter if I have symptoms?  Certain symptoms can be a sign that you have a condition in your lungs that should be checked and treated by your doctor. These symptoms include fever, chest pain, a new or changing cough, shortness of breath that you have never felt before, coughing up blood or unexplained weight loss. Having any of these symptoms can greatly affect the results of lung cancer screening.       Should all smokers get an LDCT lung cancer screening exam?  It depends. Lung cancer screening is for a very specific group of men and women who have a history of heavy smoking over a long period of time (see  Who should be screened for lung cancer  above).  I am in the high-risk group, but have been diagnosed with cancer in the past. Is LDCT lung cancer screening right for me?  In some cases, you should not have LDCT lung screening, such as when your doctor is already following your cancer with CT scan studies. Your doctor will help you decide if  LDCT lung screening is right for you.  Do I need to have a screening exam every year?  Yes. If you are in the high-risk group described earlier, you should get an LDCT lung cancer screening exam every year until you are 79, or are no longer willing or able to undergo screening and possible procedures to diagnose and treat lung cancer.  How effective is LDCT at preventing death from lung cancer?  Studies have shown that LDCT lung cancer screening can lower the risk of death from lung cancer by 20 percent in people who are at high-risk.  What are the risks?  There are some risks and limitations of LDCT lung cancer screening. We want to make sure you understand the risks and benefits, so please let us know if you have any questions. Your doctor may want to talk with you more about these risks.   Radiation exposure: As with any exam that uses radiation, there is a very small increased risk of cancer. The amount of radiation in LDCT is small--about the same amount a person would get from a mammogram. Your doctor orders the exam when he or she feels the potential benefits outweigh the risks.   False negatives: No test is perfect, including LDCT. It is possible that you may have a medical condition, including lung cancer, that is not found during your exam. This is called a false negative result.   False positives and more testing: LDCT very often finds something in the lung that could be cancer, but in fact is not. This is called a false positive result. False positive tests often cause anxiety. To make sure these findings are not cancer, you may need to have more tests. These tests will be done only if you give us permission. Sometimes patients need a treatment that can have side effects, such as a biopsy. For more information on false positives, see  What can I expect from the results?    Findings not related to lung cancer: Your LDCT exam also takes pictures of areas of your body next to your lungs. In a very small  number of cases, the CT scan will show an abnormal finding in one of these areas, such as your kidneys, adrenal glands, liver or thyroid. This finding may not be serious, but you may need more tests. Your doctor can help you decide what other tests you may need, if any.  What can I expect from the results?  About 1 out of 4 LDCT exams will find something that may need more tests. Most of the time, these findings are lung nodules. Lung nodules are very small collections of tissue in the lung. These nodules are very common, and the vast majority--more than 97 percent--are not cancer (benign). Most are normal lymph nodes or small areas of scarring from past infections.  But, if a small lung nodule is found to be cancer, the cancer can be cured more than 90 percent of the time. To know if the nodule is cancer, we may need to get more images before your next yearly screening exam. If the nodule has suspicious features (for example, it is large, has an odd shape or grows over time), we will refer you to a specialist for further testing.  Will my doctor also get the results?  Yes. Your doctor will get a copy of your results.  Is it okay to keep smoking now that there s a cancer screening exam?  No. Tobacco is one of the strongest cancer-causing agents. It causes not only lung cancer, but other cancers and cardiovascular (heart) diseases as well. The damage caused by smoking builds over time. This means that the longer you smoke, the higher your risk of disease. While it is never too late to quit, the sooner you quit, the better.  Where can I find help to quit smoking?  The best way to prevent lung cancer is to stop smoking. If you have already quit smoking, congratulations and keep it up! For help on quitting smoking, please call [facility] at 4-256-QUITNOW (1-337.404.9241) or the American Cancer Society at 1-217.226.8623 to find local resources near you.  One-on-one health coaching:  If you d prefer to work individually  with a health care provider on tobacco cessation, we offer:     Medication Therapy Management:  Our specially trained pharmacists work closely with you and your doctor to help you quit smoking.  Call [phone] or [phone] (toll free).        Suicide Emergency First call for help:  [phone]          Counselors:     [facility]: [phone]    Shanti Titus: [phone]    Lorelei Simeon: [phone]    Arturo Titus CNP, [facility]: [phone]    Neetu Starks: [phone]    [facility]: [phone]    Ford Hardin: [phone]    Sarah [phone]    [facility]: [phone]    Rowena Nino: [phone]    [facility]: [phone]

## 2023-11-13 NOTE — COMMUNITY RESOURCES LIST (ENGLISH)
11/13/2023   [facility]  N/A  For questions about this resource list or additional care needs, please contact your primary care clinic or care manager.  Phone: [phone]   Email: N/A   Address: [address] [place] [address]   Hours: N/A        Financial Stability       Utility payment assistance  1  [employer] [facility] Office - Energy Assistance Program Distance: 4.54 miles      Phone/Virtual   [address] [place] [address]  Language: English  Hours: Mon - Thu 8:00 AM - 4:30 PM , Fri 8:00 AM - 12:00 PM  Fees: Free   Phone: (580) 621-9714 Email: zak@[employer] Website: http://www.[employer]          Food and Nutrition       Food pantry  2  [facility] [employer] in [facility] Distance: 6.57 miles      In-Person   [address] [place] [address]  Language: English  Hours: Mon - Thu 11:00 AM - 3:30 PM  Fees: Free   Phone: (718) 732-4514 Email: info@[employer] Website: https://FanLib     3  [facility] Food Shelf Distance: 9.82 miles      [facility]   [address] [place] [address]  Language: English  Hours: Thu 10:00 AM - 1:00 PM  Fees: Free   Phone: (731) 356-2285 Email: rosalia@[employer] Website: https://www.Quick Key.com/DeerRiverAreaFoodShelf/     SNAP application assistance  4  [facility] Distance: 5.36 miles      [address] [place] [address]  Language: English  Hours: Mon - Fri 8:00 AM - 4:30 PM  Fees: Free   Phone: (156) 584-4925 Website: https://www.\[facility]\""./[facility]/Health-Human-Services     5  [facility] [employer] [facility] Distance: 5.36 miles      In-Person, Phone/Virtual   [address] [place] [address]  Language: English  Hours: Mon 8:00 AM - 4:30 PM Appt. Only, Tue - Thu 8:00 AM - 4:30 PM , Fri 8:00 AM - 4:30 PM Appt. Only  Fees: Free   Phone: (657) 728-3614 Website:  http://www.Yoyo.org          Transportation       Free or low-cost transportation  6  [facility] [employer] [facility] - [employer] - Free or low-cost transportation Distance: 5.36 miles      In-Person   [address] [place] [address]  Language: English  Hours: Mon 8:00 AM - 4:30 PM Appt. Only, Tue - Thu 8:00 AM - 4:30 PM , Fri 8:00 AM - 4:30 PM Appt. Only  Fees: Free   Phone: (719) 103-4642 Website: http://www.[employer]ision          Important Numbers & Websites       Emergency Services   911  City Services   311  Poison Control   (632) 254-4366  Suicide Prevention Lifeline   (969) 865-9229 (TALK)  Child Abuse Hotline   (340) 897-4328 (4-A-Child)  Sexual Assault Hotline   (417) 353-7484 (HOPE)  National Runaway Safeline   (946) 342-2667 (RUNAWAY)  All-Options Talkline   (974) 149-2102  Substance Abuse Referral   (904) 372-8991 (HELP)

## 2023-11-14 ENCOUNTER — TELEPHONE (OUTPATIENT)
Dept: FAMILY MEDICINE | Facility: OTHER | Age: 58
End: 2023-11-14

## 2023-11-14 LAB — HIV 1+2 AB+HIV1 P24 AG SERPL QL IA: NONREACTIVE

## 2023-11-14 ASSESSMENT — ENCOUNTER SYMPTOMS
FEVER: 0
TROUBLE SWALLOWING: 0
SHORTNESS OF BREATH: 0
FACIAL SWELLING: 0
CHILLS: 0
CHEST TIGHTNESS: 0

## 2023-11-14 NOTE — TELEPHONE ENCOUNTER
Patient saw Danii Sung yesterday. She is wondering about the  Ensure Plus. Please call.    Brie Reynoso on 11/14/2023 at 3:26 PM

## 2023-11-14 NOTE — TELEPHONE ENCOUNTER
Patient states [facility] pharmacy sent a fax that they are needing approval or something. Under the medication requested a PA.   Notified patient we can check back and let her know if it shows approval on the medication.   Marita Zhao LPN .............11/14/2023     3:55 PM

## 2023-11-15 ENCOUNTER — HOSPITAL ENCOUNTER (OUTPATIENT)
Dept: MAMMOGRAPHY | Facility: OTHER | Age: 58
Discharge: HOME | End: 2023-11-15
Attending: PHYSICIAN ASSISTANT | Admitting: PHYSICIAN ASSISTANT
Payer: COMMERCIAL

## 2023-11-15 DIAGNOSIS — Z12.31 ENCOUNTER FOR SCREENING MAMMOGRAM FOR MALIGNANT NEOPLASM OF BREAST: ICD-10-CM

## 2023-11-15 LAB — HCV AB SERPL QL IA: REACTIVE

## 2023-11-15 PROCEDURE — 77067 SCR MAMMO BI INCL CAD: CPT

## 2023-11-15 NOTE — TELEPHONE ENCOUNTER
Has the provider received anything  about a prior authorization ? Zelda Toledo LPN ....................11/15/2023  11:30 AM

## 2023-11-15 NOTE — TELEPHONE ENCOUNTER
I have not seen any paperwork. Please send to prior auth nurses to see if they completed paperwork.   Kathy Sung PA-C ..................11/15/2023 12:11 PM

## 2023-11-16 LAB — HCV RNA SERPL NAA+PROBE-ACNC: NOT DETECTED IU/ML

## 2023-11-17 ENCOUNTER — HOSPITAL ENCOUNTER (OUTPATIENT)
Dept: CT IMAGING | Facility: OTHER | Age: 58
Discharge: HOME | End: 2023-11-17
Attending: PHYSICIAN ASSISTANT | Admitting: PHYSICIAN ASSISTANT
Payer: COMMERCIAL

## 2023-11-17 DIAGNOSIS — Z87.891 PERSONAL HISTORY OF TOBACCO USE: ICD-10-CM

## 2023-11-17 LAB
HUMAN PAPILLOMA VIRUS 16 DNA: NEGATIVE
HUMAN PAPILLOMA VIRUS 18 DNA: NEGATIVE
HUMAN PAPILLOMA VIRUS FINAL DIAGNOSIS: NORMAL
HUMAN PAPILLOMA VIRUS OTHER HR: NEGATIVE

## 2023-11-17 PROCEDURE — 71271 CT THORAX LUNG CANCER SCR C-: CPT

## 2023-11-20 LAB
BKR LAB AP GYN ADEQUACY: NORMAL
BKR LAB AP GYN INTERPRETATION: NORMAL
BKR LAB AP HPV REFLEX: NORMAL
BKR LAB AP PREVIOUS ABNORMAL: NORMAL
PATH REPORT.COMMENTS IMP SPEC: NORMAL
PATH REPORT.COMMENTS IMP SPEC: NORMAL
PATH REPORT.RELEVANT HX SPEC: NORMAL

## 2023-11-22 ENCOUNTER — TELEPHONE (OUTPATIENT)
Dept: FAMILY MEDICINE | Facility: OTHER | Age: 58
End: 2023-11-22
Payer: COMMERCIAL

## 2023-11-22 NOTE — TELEPHONE ENCOUNTER
Nutritional Supplements (ENSURE PLUS HIGH PROTEIN) LIQD sent to [facility] on 11/13/23.    Called [facility] and they state they have Rx but they are awaiting a PA.    Called and spoke with PA department and they state that Ensure is considered a DME so they would not do it.  Will check with refill nurse if PA received.    Called and left message for patient to return call.  Eugenia Titus RN on 11/22/2023 at 12:09 PM

## 2023-11-22 NOTE — TELEPHONE ENCOUNTER
Spoke with patient and informed of below.  Called [facility] and they will resend PA.    Eugenia Titus RN on 11/22/2023 at 1:41 PM

## 2023-11-22 NOTE — TELEPHONE ENCOUNTER
Patient stated the pharmacy has yet to receive this order. Patient requests a call back regarding if she did get the prescription or not.    Reason for call: Medication or medication refill    Name of medication requested: Ensure plus - 30 day supply    How many days of medication do you have left? NEW    [facility]    Preferred method for responding to this message: Telephone Call    Phone number patient can be reached at: Cell number on file:    Telephone Information:   Mobile [phone]       If we cannot reach you directly, may we leave a detailed response at the number you provided? Yes      Susi Pagan on 11/22/2023 at 11:42 AM

## 2023-11-27 NOTE — TELEPHONE ENCOUNTER
Prior auth received by Sravanthi RAMIREZ and sent to provider for review.    Inna Walters RN on 11/27/2023 at 8:27 AM

## 2023-12-04 ENCOUNTER — HOSPITAL ENCOUNTER (OUTPATIENT)
Dept: BONE DENSITY | Facility: OTHER | Age: 58
Discharge: HOME | End: 2023-12-04
Attending: PHYSICIAN ASSISTANT | Admitting: PHYSICIAN ASSISTANT
Payer: COMMERCIAL

## 2023-12-04 DIAGNOSIS — Z13.820 SCREENING FOR OSTEOPOROSIS: ICD-10-CM

## 2023-12-04 PROCEDURE — 77080 DXA BONE DENSITY AXIAL: CPT

## 2024-01-29 ENCOUNTER — PATIENT OUTREACH (OUTPATIENT)
Dept: GASTROENTEROLOGY | Facility: CLINIC | Age: 59
End: 2024-01-29

## 2024-02-13 ENCOUNTER — TELEPHONE (OUTPATIENT)
Dept: FAMILY MEDICINE | Facility: OTHER | Age: 59
End: 2024-02-13

## 2024-02-13 NOTE — TELEPHONE ENCOUNTER
Patient checked herself into [facility] [employer]. She uses [facility] at night and drinks two Ensure a day, but they will not give to her as they are not listed as prescribed by [facility].  Please fax to [facility] [phone].    Brie Reynoso on 2/13/2024 at 4:11 PM

## 2024-02-14 NOTE — TELEPHONE ENCOUNTER
Patient is approved to drink 2 ensures a day and use [facility] at night.  Please let me know if you have any questions or concerns.  Kathy Sung PA-C.......... 2/14/2024  12:46 PM

## 2024-12-12 ENCOUNTER — OFFICE VISIT (OUTPATIENT)
Dept: FAMILY MEDICINE | Facility: OTHER | Age: 59
End: 2024-12-12
Attending: NURSE PRACTITIONER

## 2024-12-12 VITALS
OXYGEN SATURATION: 97 % | BODY MASS INDEX: 19.79 KG/M2 | TEMPERATURE: 99.4 F | RESPIRATION RATE: 20 BRPM | SYSTOLIC BLOOD PRESSURE: 138 MMHG | HEART RATE: 97 BPM | HEIGHT: 61 IN | DIASTOLIC BLOOD PRESSURE: 88 MMHG | WEIGHT: 104.8 LBS

## 2024-12-12 DIAGNOSIS — B97.89 VIRAL RESPIRATORY ILLNESS: Primary | ICD-10-CM

## 2024-12-12 DIAGNOSIS — R50.9 LOW GRADE FEVER: ICD-10-CM

## 2024-12-12 DIAGNOSIS — J98.8 VIRAL RESPIRATORY ILLNESS: Primary | ICD-10-CM

## 2024-12-12 DIAGNOSIS — J44.1 COPD EXACERBATION (H): ICD-10-CM

## 2024-12-12 RX ORDER — PREDNISONE 20 MG/1
20 TABLET ORAL 2 TIMES DAILY
Qty: 10 TABLET | Refills: 0 | Status: SHIPPED | OUTPATIENT
Start: 2024-12-12 | End: 2024-12-17

## 2024-12-12 ASSESSMENT — PAIN SCALES - GENERAL: PAINLEVEL_OUTOF10: NO PAIN (0)

## 2024-12-12 NOTE — NURSING NOTE
"Patient presents to clinic for shortness of breath and congestion since Sunday, 12/8/24. Patient has been using her inhalers PRN for shortness of breath.    Chief Complaint   Patient presents with    Shortness of Breath       FOOD SECURITY SCREENING QUESTIONS  Hunger Vital Signs:  Within the past 12 months we worried whether our food would run out before we got money to buy more. Never  Within the past 12 months the food we bought just didn't last and we didn't have money to get more. Never  Jannacharito Banegason 12/12/2024 10:53 AM      Initial /88 (BP Location: Right arm, Patient Position: Sitting, Cuff Size: Adult Regular)   Pulse 97   Temp 99.4  F (37.4  C) (Tympanic)   Resp 20   Ht 1.549 m (5' 1\")   Wt 47.5 kg (104 lb 12.8 oz)   SpO2 97%   BMI 19.80 kg/m   Estimated body mass index is 19.8 kg/m  as calculated from the following:    Height as of this encounter: 1.549 m (5' 1\").    Weight as of this encounter: 47.5 kg (104 lb 12.8 oz).  Medication Reconciliation: complete    Janna Lynn  "

## 2024-12-12 NOTE — PROGRESS NOTES
ASSESSMENT/PLAN:     I have reviewed the nursing notes.  I have reviewed the findings, diagnosis, plan and need for follow up with the patient.        1. Low grade fever  2. Viral respiratory illness (Primary)  Patient declines viral testing (covid, influenza)  Discussed with patient that symptoms and exam are consistent with viral illness.    No clinical indications for antibiotic treatment at this time.    Symptomatic treatment - Encouraged fluids, salt water gargles, honey, elevation, humidifier, saline nasal spray, sinus rinse/netti pot, lozenges, tea, soup, smoothies, popsicles, topical vapor rub, rest, etc   May use over-the-counter Tylenol or ibuprofen PRN    Discussed warning signs/symptoms indicative of need to f/u  Follow up if symptoms persist or worsen or concerns    3. COPD exacerbation (H)  - predniSONE (DELTASONE) 20 MG tablet; Take 1 tablet (20 mg) by mouth 2 times daily for 5 days.  Dispense: 10 tablet; Refill: 0    Continue current inhalers including Dulera, Spiriva, and Albuterol   Discussed warning signs/symptoms indicative of need to f/u  Follow up if symptoms persist or worsen or concerns            I explained my diagnostic considerations and recommendations to the patient, who voiced understanding and agreement with the treatment plan. All questions were answered. We discussed potential side effects of any prescribed or recommended therapies, as well as expectations for response to treatments.    Anai Schneider NP  [facility]      SUBJECTIVE:   Megan Cash is a 59 year old female who presents to clinic today for the following health issues:  Work noted due to COPD with shortness of breath    HPI  History of COPD treats with Spiriva inhaler, Dulera inhaler and albuterol inhalers.  Experiencing increased shortness of breath with nasal congestion,chest congestion, headaches and fatigue for the past 4 days.   States she is starting to feel better but needs a work note.    Chest tightness, cough, and shortness of breath is lessening.  She continues to have episodes of difficulty catching her brath with hard coughing fits.  Low grade fevers.  Appetite fair.  Drinking fluids well.   She is boiling water on stove and taking hot showers.  Using vicks rub.        Past Medical History:   Diagnosis Date    Nicotine dependence, uncomplicated     No Comments Provided    Presence of dental prosthetic device     No Comments Provided    Uncomplicated alcohol abuse     No Comments Provided    Viral hepatitis C without hepatic coma     5/11/2016     Past Surgical History:   Procedure Laterality Date    COLONOSCOPY  05/16/2016 5/16/16,[id number],COLONOSCOPY W/ BIOPSIES,colon polyps-repeat in 3 years    COLONOSCOPY N/A 7/7/2020    2 small tubular adenomas.  Follow up 2025    CONIZATION LEEP      10/28/2010,Had YAHAIRA 1 but wanted something done    LAPAROSCOPIC TUBAL LIGATION      1988    OTHER SURGICAL HISTORY      [id number],OTHER,Full dentures     Social History     Tobacco Use    Smoking status: Every Day     Current packs/day: 0.75     Average packs/day: 0.8 packs/day for 40.0 years (30.0 ttl pk-yrs)     Types: Cigarettes    Smokeless tobacco: Never   Substance Use Topics    Alcohol use: Yes     Alcohol/week: 32.0 standard drinks of alcohol     Types: 32 Cans of beer per week     Comment: Alcoholic Drinks/day: 4-6 beer a day     Current Outpatient Medications   Medication Sig Dispense Refill    albuterol (PROAIR HFA/PROVENTIL HFA/VENTOLIN HFA) 108 (90 Base) MCG/ACT inhaler INHALE 2 PUFFS INTO THE LUNGS EVERY 4 HOURS AS NEEDED FOR SHORTNESS OF BREATH OR DIFFICULT BREATHING OR WHEEZING 18 g 11    escitalopram (LEXAPRO) 20 MG tablet Take 1 tablet (20 mg) by mouth daily 90 tablet 3    mometasone-formoterol (DULERA) 100-5 MCG/ACT inhaler Inhale 2 puffs into the lungs 2 times daily 39 g 4    Nutritional Supplements (ENSURE PLUS HIGH PROTEIN) LIQD Take 1 Can by mouth daily 7110 mL 11    tiotropium (SPIRIVA  "RESPIMAT) 2.5 MCG/ACT inhaler Inhale 2 puffs into the lungs daily 12 g 11    chlorhexidine (PERIDEX) 0.12 % solution Swish and spit 15 mLs in mouth 2 times daily Until follow-up with dentist. (Patient not taking: Reported on 12/12/2024) 473 mL 1    nicotine (NICORETTE) 2 MG gum Place 1 each (2 mg) inside cheek every hour as needed for smoking cessation (Patient not taking: Reported on 12/12/2024) 200 each 11     No Known Allergies      Past medical history, past surgical history, current medications and allergies reviewed and accurate to the best of my knowledge.        OBJECTIVE:     /88 (BP Location: Right arm, Patient Position: Sitting, Cuff Size: Adult Regular)   Pulse 97   Temp 99.4  F (37.4  C) (Tympanic)   Resp 20   Ht 1.549 m (5' 1\")   Wt 47.5 kg (104 lb 12.8 oz)   SpO2 97%   BMI 19.80 kg/m    Body mass index is 19.8 kg/m .        Physical Exam  General Appearance: Well appearing adult female, appropriate appearance for age. No acute distress  Orophayrnx:  voice clear.    Nose: drainage or congestion   Neck: supple without adenopathy  Respiratory: normal chest wall and respirations.  Normal effort.  Clear to auscultation bilaterally, no wheezing, crackles or rhonchi.  No increased work of breathing.  No cough appreciated.  Cardiac: RRR with no murmurs  Musculoskeletal:  Equal movement of bilateral upper extremities.  Equal movement of bilateral lower extremities.  Normal gait.    Psychological: normal affect, alert, oriented, and pleasant.         "

## 2024-12-12 NOTE — LETTER
December 12, 2024      Megan Cash  [address] [place] [id number]        To Whom It May Concern:    Megan Cash  was seen on 12/12/24.  Please excuse her from work 12/10/24 through 12/15/24 due to fever and illness.  May return to work on 12/16/24.        Sincerely,        Anai Schneider NP

## 2024-12-29 ENCOUNTER — HEALTH MAINTENANCE LETTER (OUTPATIENT)
Age: 59
End: 2024-12-29

## 2025-04-01 ENCOUNTER — PATIENT OUTREACH (OUTPATIENT)
Dept: GASTROENTEROLOGY | Facility: CLINIC | Age: 60
End: 2025-04-01
Payer: COMMERCIAL

## 2025-04-01 DIAGNOSIS — Z12.11 SPECIAL SCREENING FOR MALIGNANT NEOPLASMS, COLON: Primary | ICD-10-CM

## 2025-04-01 NOTE — PROGRESS NOTES
"[facility] Screening Colonoscopy Referral Review    Patient meets the inclusion criteria for screening colonoscopy standing order.    Ordering/Referring Provider:  Kathy Sung    BMI: Estimated body mass index is 19.8 kg/m  as calculated from the following:    Height as of 12/12/24: 1.549 m (5' 1\").    Weight as of 12/12/24: 47.5 kg (104 lb 12.8 oz).     Sedation:  Does patient have any of the following conditions affecting sedation?  Hx of chemical dependency: MAC sedation recommended    Previous Scopes:  Any previous recommendations or follow up needs based on previous scope?  na / No recommendations.    Medical Concerns to Postpone Order:  Does patient have any of the following medical concerns that should postpone/delay colonoscopy referral?  No medical conditions affecting colonoscopy referral.    Final Referral Details:  Based on patient's medical history patient is appropriate for referral order with MAC/deep sedation.   BMI<= 45 45 < BMI <= 48 48 < BMI < = 50  BMI > 50   No Restrictions No [facility]  No [facility]  [facility] with exceptions Hospital Only OR Only     "

## (undated) DEVICE — ENDO SNARE EXACTO COLD 9MM LOOP 2.4MMX230CM 00711115

## (undated) DEVICE — TUBING SUCTION 10'X3/16" N510

## (undated) DEVICE — SOL WATER 1500ML

## (undated) DEVICE — ENDO KIT COMPLIANCE DYKENDOCMPLY

## (undated) DEVICE — ENDO BRUSH CHANNEL MASTER CLEANING 2-4.2MM BW-412T

## (undated) DEVICE — SUCTION MANIFOLD NEPTUNE 2 SYS 4 PORT 0702-020-000

## (undated) RX ORDER — LIDOCAINE HYDROCHLORIDE 20 MG/ML
INJECTION, SOLUTION EPIDURAL; INFILTRATION; INTRACAUDAL; PERINEURAL
Status: DISPENSED
Start: 2020-07-07

## (undated) RX ORDER — KETOROLAC TROMETHAMINE 30 MG/ML
INJECTION, SOLUTION INTRAMUSCULAR; INTRAVENOUS
Status: DISPENSED
Start: 2023-03-25

## (undated) RX ORDER — PROPOFOL 10 MG/ML
INJECTION, EMULSION INTRAVENOUS
Status: DISPENSED
Start: 2020-07-07